# Patient Record
Sex: MALE | Race: WHITE | NOT HISPANIC OR LATINO | Employment: FULL TIME | URBAN - METROPOLITAN AREA
[De-identification: names, ages, dates, MRNs, and addresses within clinical notes are randomized per-mention and may not be internally consistent; named-entity substitution may affect disease eponyms.]

---

## 2017-11-24 ENCOUNTER — ALLSCRIPTS OFFICE VISIT (OUTPATIENT)
Dept: OTHER | Facility: OTHER | Age: 32
End: 2017-11-24

## 2017-11-26 NOTE — PROGRESS NOTES
Assessment    1  Mechanical low back pain (724 2) (M54 5)   2  Umbilical swelling (283 85) (R19 09)   3  Gastroesophageal reflux disease, esophagitis presence not specified (530 81) (K21 9)   4  Elevated BP without diagnosis of hypertension (796 2) (R03 0)   5  Cigarette nicotine dependence without complication (166 8) (P22 923)    Plan   Gastroesophageal reflux disease, esophagitis presence not specified    · Omeprazole 40 MG Oral Capsule Delayed Release; take 1 capsule by mouthevery morning BEFORE BREAKFAST  Mechanical low back pain    · Meloxicam 15 MG Oral Tablet; TAKE 1 TABLET DAILY AS NEEDED   · Metaxalone 800 MG Oral Tablet; TAKE ONE (1) TABLET(S) EVERY 6 TO 8HOURSAS NEEDED FOR PAIN  Umbilical swelling    · 1 - Skip DAS, Herman King (General Surgery) Co-Management  *  Status: Active Requested for: 24XCU2559  Care Summary provided  : Yes  Follow-up visit in 1 month Evaluation and Treatment  Follow-up  Status: Hold For - Scheduling  Requested for: 11XWU2728 Ordered; For: Elevated BP without diagnosis of hypertension;  Ordered By: Es Stockton  Performed:   Due: 80RJY1491   Discussion/Summary    NSAID and muscle relaxer refills issued to use as needed for LBP  Use back support as tolerated, activity as tolerated, and alternating ice/heat compresses  Discussed possibility of PT if pain persists  entered to see surgeon for further eval of possible umbilical hernia  issued for rx omeprazole to use instead of OTC in 1 month for recheck of BP  May need start of antihypertensive if still elevated  Further discuss smoking cessation at that time  shot declined today  Possible side effects of new medications were reviewed with the patient/guardian today  The treatment plan was reviewed with the patient/guardian  The patient/guardian understands and agrees with the treatment plan      Chief Complaint  back pain       History of Present Illness  HPI: Has had right sided back pain again for past month   Wears a copper fit back brace but hasn't been able to wear due to belly button pain past few weeks  Thinks he has a hernia  Sees a bulge  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)  ibuprofen 4-6 tablets daily without relief  been given muscle relaxers last year but makes him feel weird and can't take when working  OTC omeprazole recently w/o as much benefit as when he was on rx strength  Requests refill of  Review of Systems   Constitutional: no fever  Gastrointestinal: no nausea,-- no vomiting,-- no constipation-- and-- no diarrhea--   The patient presents with complaints of moderate mid abdominal pain, described as sharp, non-radiating  Musculoskeletal: as noted in HPI  Active Problems  1  Cigarette nicotine dependence without complication (059 5) (Y70 593)   2  Elevated BP without diagnosis of hypertension (796 2) (R03 0)   3  History of Encounter for removal of sutures (V58 32) (Z48 02)   4  Gastroesophageal reflux disease, esophagitis presence not specified (530 81) (K21 9)   5  History of Localized skin mass, lump, or swelling (782 2) (R22 9)   6  Mechanical low back pain (724 2) (M54 5)   7  Migraine headache (346 90) (G43 909)   8  Need for Tdap vaccination (V06 1) (Z23)   9  Screening for hyperlipidemia (V77 91) (Z13 220)   10  History of Viral wart on finger (078 10) (B07 9)    Past Medical History  1  Acute upper respiratory infection (465 9) (J06 9)   2  History of Encounter for removal of sutures (V58 32) (Z48 02)   3  History of Localized skin mass, lump, or swelling (782 2) (R22 9)   4  Mechanical low back pain (724 2) (M54 5)   5  History of Viral wart on finger (078 10) (B07 9)  Active Problems And Past Medical History Reviewed: The active problems and past medical history were reviewed and updated today  Family History  Mother    1  Family history of Breast cancer (174 9) (C50 919)   2  Family history of Degenerative disc disease (722 6)  Father    3   Family history of Diabetes (250 00) (E11 9)  Sister    4  Family history of Diabetes (250 00) (E11 9)   5  Family history of migraine headaches (V17 2) (Z82 0)    Social History     · History of Current every day smoker (305 1) (F17 200)   · Current smoker on some days (305 1) (F17 200)   · History of prescription drug abuse (305 93) (F19 21)   ·   The social history was reviewed and updated today  Current Meds   1  Buprenorphine HCl-Naloxone HCl - 8-2 MG Sublingual Tablet Sublingual Recorded   2  Ibuprofen 200 MG Oral Tablet Recorded   3  Omeprazole 40 MG Oral Capsule Delayed Release; take 1 capsule by mouth every morning BEFORE BREAKFAST; Therapy: 47KCD7947 to (Evaluate:06Jan2017)  Requested for: 99NNM1976; Last Rx:78Bgt7929 Ordered    The medication list was reviewed and updated today  Allergies  1  No Known Drug Allergies    Vitals   Recorded: 57KLW2064 09:52AM   Temperature 96 F, Tympanic   Heart Rate 88   Systolic 482, Sitting   Diastolic 84, Sitting   BP CUFF SIZE Large   Height 6 ft 1 5 in   Weight 292 lb    BMI Calculated 38   BSA Calculated 2 54       Physical Exam   Constitutional  General appearance: No acute distress, well appearing and well nourished  Eyes  Conjunctiva and lids: No swelling, erythema, or discharge  Pulmonary  Respiratory effort: No increased work of breathing or signs of respiratory distress  Abdomen  Abdomen: Abnormal   The abdomen was rounded  There was moderate tenderness in the periumbilical area  The abdomen was not firm-- and-- not rigid  Guarding was present  mass palpated in the periumbilical area  Psychiatric  Mood and affect: Normal          Results/Data  PHQ-2 Adult Depression Screening 24Nov2017 09:56AM User, Ahs     Test Name Result Flag Reference   PHQ-2 Adult Depression Score 2       Over the last two weeks, how often have you been bothered by any of the following problems?  Little interest or pleasure in doing things: Several days - 1 Feeling down, depressed, or hopeless: Several days - 1   PHQ-2 Adult Depression Screening Negative         Attending Note  Collaborating Physician Note: Collaborating Note: I agree with the Advanced Practitioner note  Future Appointments    Date/Time Provider Specialty Site   12/07/2017 09:30 AM Royal Jaramillo MD General Surgery [facility]   01/02/2018 11:20 AM Chavo Mejia MD       Signatures   Electronically signed by :  MUKESH Holland; Nov 24 2017  2:01PM EST                       (Author)    Electronically signed by : Francisca Donohue MD; Nov 25 2017  6:48AM EST                       (Co-author)

## 2017-11-27 ENCOUNTER — GENERIC CONVERSION - ENCOUNTER (OUTPATIENT)
Dept: OTHER | Facility: OTHER | Age: 32
End: 2017-11-27

## 2017-12-15 ENCOUNTER — OFFICE VISIT (OUTPATIENT)
Dept: URGENT CARE | Facility: CLINIC | Age: 32
End: 2017-12-15
Payer: COMMERCIAL

## 2017-12-15 PROCEDURE — 99213 OFFICE O/P EST LOW 20 MIN: CPT

## 2017-12-16 NOTE — PROGRESS NOTES
Assessment  1  Acute lumbar myofascial strain (847 2) (L56 942Y)    Plan  Acute lumbar myofascial strain    · Methocarbamol 750 MG Oral Tablet; TAKE 1 TABLET EVERY 6 HOURS ASNEEDED   · PredniSONE 10 MG Oral Tablet; 6,5,4,3,2,1    Discussion/Summary  Discussion Summary:   Follow-up with PCP next week  Medication Side Effects Reviewed: Possible side effects of new medications were reviewed with the patient/guardian today  Understands and agrees with treatment plan: The treatment plan was reviewed with the patient/guardian  The patient/guardian understands and agrees with the treatment plan      Chief Complaint  1  Back Pain  Chief Complaint Free Text Note Form: Pt reports 4 days of lower back pain  Denies injury  History of Present Illness  HPI: patient presents with complaint of exacerbation of chronic myofascial right-sided thoracic back pain  He does not recall a specific injury, he does recall leaning forward and standing up and feeling acute pain  he saw his PCP 3 weeks ago for similar complaint  he was unable to  Skelaxin due to cost  he also has not suspected hernia for which she has not yet seen the surgeon  He denies any radicular symptoms or lower extremity weakness   Hospital Based Practices Required Assessment:  Pain Assessment  the patient states they have pain  The pain is located in the back  (on a scale of 0 to 10, the patient rates the pain at 7 )  Abuse And Domestic Violence Screen   Domestic violence screen not done today  Reason DV Screen not done: family present   Depression And Suicide Screen  Suicide screen not done today  -- Reason suicide screen not done: family present  Prefered Language is  [place]  Primary Language is  English  Review of Systems  Focused-Male:  Constitutional: no fever or chills, feels well, no tiredness, no recent weight loss or weight gain  Musculoskeletal: as noted in HPI    Neurological: no complaints of headache, no confusion, no numbness or tingling, no dizziness or fainting  Active Problems  1  Cigarette nicotine dependence without complication (803 7) (F07 048)   2  Elevated BP without diagnosis of hypertension (796 2) (R03 0)   3  History of Encounter for removal of sutures (V58 32) (Z48 02)   4  Gastroesophageal reflux disease, esophagitis presence not specified (530 81) (K21 9)   5  History of Localized skin mass, lump, or swelling (782 2) (R22 9)   6  Mechanical low back pain (724 2) (M54 5)   7  Migraine headache (346 90) (G43 909)   8  Need for Tdap vaccination (V06 1) (Z23)   9  Screening for hyperlipidemia (V77 91) (Z13 220)   10  Umbilical swelling (952 05) (R19 09)   11  History of Viral wart on finger (078 10) (B07 9)    Past Medical History  1  Acute upper respiratory infection (465 9) (J06 9)   2  History of Encounter for removal of sutures (V58 32) (Z48 02)   3  History of Localized skin mass, lump, or swelling (782 2) (R22 9)   4  Mechanical low back pain (724 2) (M54 5)   5  History of Viral wart on finger (078 10) (B07 9)    Family History  Mother    1  Family history of Breast cancer (174 9) (C50 919)   2  Family history of Degenerative disc disease (722 6)  Father    3  Family history of Diabetes (250 00) (E11 9)  Sister    4  Family history of Diabetes (250 00) (E11 9)   5  Family history of migraine headaches (V17 2) (Z82 0)    Social History   · History of Current every day smoker (305 1) (F17 200)   · Current smoker on some days (305 1) (F17 200)   · History of prescription drug abuse (305 93) (F19 21)   ·     Current Meds   1  Buprenorphine HCl-Naloxone HCl - 8-2 MG Sublingual Tablet Sublingual; Therapy: (Recorded:85Ceh9088) to Recorded   2  Ibuprofen 200 MG Oral Tablet Recorded   3  Meloxicam 15 MG Oral Tablet; TAKE 1 TABLET DAILY AS NEEDED; Therapy: 82LBZ9161 to (Evaluate:81Msy1387)  Requested for: 45PRT2797; Last Rx:24Nov2017 Ordered   4   Metaxalone 800 MG Oral Tablet; TAKE ONE (1) TABLET(S) EVERY 6 TO 8HOURS AS NEEDED FOR PAIN; Therapy: 32SGL4371 to (Evaluate:02Dec2017)  Requested for: 08TMA6694; Last Rx:24Nov2017 Ordered   5  Omeprazole 40 MG Oral Capsule Delayed Release; take 1 capsule by mouth every morning BEFORE BREAKFAST; Therapy: 13ISP5076 to (Evaluate:50Iwj4984)  Requested for: 04ZYL8507; Last Rx:24Nov2017 Ordered    Allergies  1  No Known Drug Allergies    Vitals  Signs   Recorded: 15Dec2017 05:49PM   Temperature: 97 6 F  Heart Rate: 97  Respiration: 16  Systolic: 282  Diastolic: 84  Weight: 130 lb   BMI Calculated: 38 13  BSA Calculated: 2 54  O2 Saturation: 98    Physical Exam   Constitutional  General appearance: No acute distress, well appearing and well nourished  Musculoskeletal  Gait and station: Normal    Inspection/palpation of joints, bones, and muscles: Abnormal  -- quite tender to palpation right paravertebral musculature distal thoracic and upper lumbar spine, normal squat, lower extremity strength normal   Neurologic  Reflexes: 2+ and symmetric  Sensation: No sensory loss  Psychiatric  Orientation to person, place and time: Normal        Message  Return to work or school:   [facility] is under my professional care  He was seen in my office on 12/15/2017   He is able to return to work on  12/17/2017      ABDOULAYE Gupta        Future Appointments    Date/Time Provider Specialty Site   01/02/2018 11:20 AM Shefali Conti, [address] [facility] Jose Car MD     Signatures   Electronically signed by : Pramod Jj DO; Dec 15 2017  6:11PM EST                       (Author)

## 2018-01-02 ENCOUNTER — ALLSCRIPTS OFFICE VISIT (OUTPATIENT)
Dept: OTHER | Facility: OTHER | Age: 33
End: 2018-01-02

## 2018-01-10 NOTE — MISCELLANEOUS
Message   Recorded as Task   Date: 12/07/2016 12:34 PM, Created By: Caden Buckner   Task Name: Call Back   Assigned To: Angelina Henson   Regarding Patient: Celena Uribe, Status: Active   CommentCathi Gilman - 07 Dec 2016 12:34 PM     TASK CREATED  Refilled omeprazole for 1 month  Needs to f/u as instructed to get further refills  MattIvonne man - 07 Dec 2016 6:35 PM     TASK REPLIED TO: Previously Assigned To [address] [facility]  I notified patient that he needs a f/u for further refills  Did not wish to follow-up at this time  He said he will call back to schedule  Active Problems    1  Cigarette nicotine dependence without complication (912 5) (B46 048)   2  Elevated blood pressure reading without diagnosis of hypertension (796 2) (R03 0)   3  Encounter for removal of sutures (V58 32) (Z48 02)   4  Gastroesophageal reflux disease, esophagitis presence not specified (530 81) (K21 9)   5  Localized skin mass, lump, or swelling (782 2) (R22 9)   6  Mechanical low back pain (724 2) (M54 5)   7  Migraine headache (346 90) (G43 909)   8  Need for Tdap vaccination (V06 1) (Z23)   9  Screening for hyperlipidemia (V77 91) (Z13 220)   10  History of Viral wart on finger (078 10) (B07 9)    Current Meds   1  Buprenorphine HCl-Naloxone HCl - 8-2 MG Sublingual Tablet Sublingual Recorded   2  Ibuprofen 200 MG Oral Tablet Recorded   3  Meloxicam 15 MG Oral Tablet (Mobic); TAKE 1 TABLET DAILY WITH FOOD; Therapy: 43GZR9316 to (Evaluate:45Myd0178)  Requested for: 66TWS3242; Last   Rx:38Hbs0227 Ordered   4  Omeprazole 40 MG Oral Capsule Delayed Release; take 1 capsule by mouth every   morning BEFORE BREAKFAST; Therapy: 70OPH1150 to (Evaluate:94Wix9051)  Requested for: 54FIZ8818; Last   Rx:94Mif4692 Ordered    Allergies    1   No Known Drug Allergies    Plan  Gastroesophageal reflux disease, esophagitis presence not specified    · Omeprazole 40 MG Oral Capsule Delayed Release; take 1 capsule by mouth  every morning BEFORE BREAKFAST    Signatures   Electronically signed by : Loren Patel; Dec  7 2016  9:55PM EST                       (Author)

## 2018-01-12 NOTE — MISCELLANEOUS
Message  Return to work or school:   [facility] is under my professional care  He was seen in my office on   He is able to return to work on  11/28/17      Excuse 11/21-11/28/17  MUKESH Barroso  Signatures   Electronically signed by : MUKESH Barroso; Nov 24 2017 10:12AM EST                       (Author)    Electronically signed by :  MUKESH Barroso; Nov 27 2017  6:48PM EST                       (Author)

## 2018-01-13 VITALS
TEMPERATURE: 96 F | SYSTOLIC BLOOD PRESSURE: 152 MMHG | HEART RATE: 88 BPM | DIASTOLIC BLOOD PRESSURE: 84 MMHG | HEIGHT: 74 IN | WEIGHT: 292 LBS | BODY MASS INDEX: 37.47 KG/M2

## 2018-01-17 NOTE — MISCELLANEOUS
Message   Recorded as Task   Date: 11/27/2017 01:49 PM, Created By: Jess Kaye   Task Name: Care Coordination   Assigned To: [address] [place] [address]   Regarding Patient: Irasema hTomas, Status: Active   Comment:    Jess Kaye - 27 Nov 2017 1:49 PM     TASK CREATED  Caller: Self; Care Coordination; [phone] (Home); [phone] (Work)  was seen last week - given work excuse note to return as of today - wasn't feeling well yet this morning - call out of work today - asking for extension of note - will return to work tomorrow - please fax to [phone] and pcb when done   Ivonne Ellington - 27 Nov 2017 3:01 PM     TASK REASSIGNED: Previously Assigned To [address] [place] [address]  Please adviseGeraldine - 27 Nov 2017 6:56 PM     TASK REPLIED TO: Previously Assigned To Be Mendiola  Reprinted and wife picked up  Active Problems    1  Cigarette nicotine dependence without complication (181 0) (L24 000)   2  Elevated BP without diagnosis of hypertension (796 2) (R03 0)   3  History of Encounter for removal of sutures (V58 32) (Z48 02)   4  Gastroesophageal reflux disease, esophagitis presence not specified (530 81) (K21 9)   5  History of Localized skin mass, lump, or swelling (782 2) (R22 9)   6  Mechanical low back pain (724 2) (M54 5)   7  Migraine headache (346 90) (G43 909)   8  Need for Tdap vaccination (V06 1) (Z23)   9  Screening for hyperlipidemia (V77 91) (Z13 220)   10  Umbilical swelling (210 12) (R19 09)   11  History of Viral wart on finger (078 10) (B07 9)    Current Meds   1  Buprenorphine HCl-Naloxone HCl - 8-2 MG Sublingual Tablet Sublingual Recorded   2  Ibuprofen 200 MG Oral Tablet Recorded   3  Meloxicam 15 MG Oral Tablet; TAKE 1 TABLET DAILY AS NEEDED; Therapy: 53HYR8366 to (Evaluate:72Ioa7082)  Requested for: 57JCW4017; Last   Rx:24Nov2017 Ordered   4   Metaxalone 800 MG Oral Tablet; TAKE ONE (1) TABLET(S) EVERY 6 TO 8HOURS AS   NEEDED FOR PAIN; Therapy: 14DNJ2050 to (Shivani Winkler)  Requested for: 19ANN2144; Last   Rx:24Nov2017 Ordered   5  Omeprazole 40 MG Oral Capsule Delayed Release; take 1 capsule by mouth every   morning BEFORE BREAKFAST; Therapy: 08XQW5260 to (Evaluate:34Iea1546)  Requested for: 87BSO5973; Last   Rx:24Nov2017 Ordered    Allergies    1  No Known Drug Allergies    Signatures   Electronically signed by :  MUKESH Nixon; Nov 27 2017  8:08PM EST                       (Author)

## 2018-01-23 VITALS
TEMPERATURE: 97.6 F | HEART RATE: 97 BPM | OXYGEN SATURATION: 98 % | DIASTOLIC BLOOD PRESSURE: 84 MMHG | WEIGHT: 293 LBS | BODY MASS INDEX: 38.13 KG/M2 | RESPIRATION RATE: 16 BRPM | SYSTOLIC BLOOD PRESSURE: 154 MMHG

## 2018-01-23 NOTE — MISCELLANEOUS
Provider Comments  Provider Comments:   Patient was a no show for today's OV  Signatures   Electronically signed by :  MUKESH Connor; Jan 2 2018 11:40AM EST                       (Author)

## 2018-01-24 NOTE — MISCELLANEOUS
Message  Return to work or school:   [facility] is under my professional care  He was seen in my office on 12/15/2017   He is able to return to work on  12/17/2017       ABDOULAYE Hopper        Future Appointments    Signatures   Electronically signed by : Khris Dawson DO; Dec 15 2017  6:11PM EST                       (Author)

## 2018-02-14 ENCOUNTER — OFFICE VISIT (OUTPATIENT)
Dept: FAMILY MEDICINE CLINIC | Facility: HOSPITAL | Age: 33
End: 2018-02-14
Payer: COMMERCIAL

## 2018-02-14 VITALS
TEMPERATURE: 98 F | SYSTOLIC BLOOD PRESSURE: 128 MMHG | HEIGHT: 75 IN | DIASTOLIC BLOOD PRESSURE: 76 MMHG | WEIGHT: 291 LBS | BODY MASS INDEX: 36.18 KG/M2 | HEART RATE: 88 BPM

## 2018-02-14 DIAGNOSIS — R51.9 ACUTE INTRACTABLE HEADACHE, UNSPECIFIED HEADACHE TYPE: Primary | ICD-10-CM

## 2018-02-14 DIAGNOSIS — R53.83 FATIGUE, UNSPECIFIED TYPE: ICD-10-CM

## 2018-02-14 LAB — S PYO AG THROAT QL: NEGATIVE

## 2018-02-14 PROCEDURE — 87880 STREP A ASSAY W/OPTIC: CPT | Performed by: NURSE PRACTITIONER

## 2018-02-14 PROCEDURE — 99213 OFFICE O/P EST LOW 20 MIN: CPT | Performed by: NURSE PRACTITIONER

## 2018-02-14 RX ORDER — OMEPRAZOLE 40 MG/1
1 CAPSULE, DELAYED RELEASE ORAL
COMMUNITY
Start: 2016-05-11

## 2018-02-14 RX ORDER — BUPRENORPHINE HYDROCHLORIDE AND NALOXONE HYDROCHLORIDE DIHYDRATE 8; 2 MG/1; MG/1
TABLET SUBLINGUAL
COMMUNITY
End: 2018-05-08 | Stop reason: DRUGHIGH

## 2018-02-14 RX ORDER — IBUPROFEN 200 MG
TABLET ORAL
COMMUNITY
End: 2018-05-08 | Stop reason: ALTCHOICE

## 2018-02-14 NOTE — LETTER
February 14, 2018     Patient: Adelita Flores   YOB: 1985   Date of Visit: 2/14/2018       To Whom it May Concern:    Adelita Flores is under my professional care  He was seen in my office on 2/14/2018  He may return to work on 2/16/18  If you have any questions or concerns, please don't hesitate to call           Sincerely,          MUKESH Inman        CC: No Recipients

## 2018-02-14 NOTE — PROGRESS NOTES
Assessment/Plan:  Headache and fatigue likely viral    Negative rapid strep  Unlikely flu given lack of other symptoms  Call with fever > 101, new symptoms or no improvement or worsening  Diagnoses and all orders for this visit:    Acute intractable headache, unspecified headache type  -     POCT rapid strepA    Fatigue, unspecified type  -     POCT rapid strepA    Other orders  -     buprenorphine-naloxone (SUBOXONE) 8-2 mg per SL tablet; Place under the tongue  -     omeprazole (PriLOSEC) 40 MG capsule; Take 1 capsule by mouth  -     ibuprofen (MOTRIN) 200 mg tablet; Take by mouth          Subjective:      Patient ID: Calvin Mcdonald is a 28 y o  male  Yesterday started with headache  Took ibuprofen  Headache dulled but cam back and worsened  Woke this morning felt like he was hit by a truck  Very fatigued  Back hurts a little  Denies fever  Denies abdominal pain/N/V/D  Denies sore throat, congestion or cough  Headache is somewhat better  Photophobia  Has some neck pain  No flu shot  Daughter diagnosed with strep a few days ago  The following portions of the patient's history were reviewed and updated as appropriate: allergies, current medications, past medical history, past social history and problem list     Review of Systems   Constitutional: Positive for fatigue  Negative for chills and fever  HENT: Negative for congestion, ear pain, sinus pain, sinus pressure and sore throat  Respiratory: Negative for cough  Gastrointestinal: Negative for abdominal pain, diarrhea, nausea and vomiting  Musculoskeletal: Positive for back pain and neck pain  Negative for arthralgias  Skin: Negative for rash  Neurological: Positive for headaches  Objective:    Vitals:    02/14/18 1417   BP: 128/76   Pulse: 88   Temp: 98 °F (36 7 °C)        Physical Exam   Constitutional: He is oriented to person, place, and time     HENT:   Right Ear: Tympanic membrane, external ear and ear canal normal  Left Ear: Tympanic membrane, external ear and ear canal normal    Mouth/Throat: Uvula is midline, oropharynx is clear and moist and mucous membranes are normal    Eyes: Conjunctivae are normal  Pupils are equal, round, and reactive to light  Cardiovascular: Normal rate, regular rhythm and normal heart sounds  Pulmonary/Chest: Effort normal and breath sounds normal    Lymphadenopathy:     He has no cervical adenopathy  Neurological: He is alert and oriented to person, place, and time  Skin: Skin is warm and dry  Psychiatric: He has a normal mood and affect

## 2018-02-16 ENCOUNTER — TELEPHONE (OUTPATIENT)
Dept: FAMILY MEDICINE CLINIC | Facility: HOSPITAL | Age: 33
End: 2018-02-16

## 2018-02-16 NOTE — TELEPHONE ENCOUNTER
Patient asking for updated work note to include 2/16/18 - return to work 2/17/18    pls fax to [phone] attn:  Kia Day    [facility] to let know status of this request

## 2018-05-08 ENCOUNTER — OFFICE VISIT (OUTPATIENT)
Dept: FAMILY MEDICINE CLINIC | Facility: HOSPITAL | Age: 33
End: 2018-05-08
Payer: COMMERCIAL

## 2018-05-08 VITALS
HEIGHT: 75 IN | BODY MASS INDEX: 37.05 KG/M2 | SYSTOLIC BLOOD PRESSURE: 132 MMHG | TEMPERATURE: 98 F | DIASTOLIC BLOOD PRESSURE: 80 MMHG | HEART RATE: 84 BPM | WEIGHT: 298 LBS

## 2018-05-08 DIAGNOSIS — K52.9 GASTROENTERITIS: Primary | ICD-10-CM

## 2018-05-08 PROCEDURE — 99213 OFFICE O/P EST LOW 20 MIN: CPT | Performed by: NURSE PRACTITIONER

## 2018-05-08 RX ORDER — ATOMOXETINE 100 MG/1
CAPSULE ORAL DAILY
COMMUNITY
Start: 2018-04-24 | End: 2018-12-08

## 2018-05-08 RX ORDER — BUPRENORPHINE HYDROCHLORIDE 8 MG/1
TABLET SUBLINGUAL 3 TIMES DAILY
COMMUNITY
Start: 2018-04-19

## 2018-05-08 NOTE — PROGRESS NOTES
Assessment/Plan:     Likely a viral gastroenteritis  [place] diet consisting of small frequent meals  Avoid caffeine and dairy  Very slowly advance as tolerated  Work note given  Call with any worsening or no improvement in 4 days  Call with any blood in stool  Diagnoses and all orders for this visit:    Gastroenteritis    Other orders  -     atomoxetine (STRATTERA) 100 MG capsule;   -     buprenorphine (SUBUTEX) 8 mg;           Subjective:     Patient ID: Flavia Clements is a 28 y o  male  Last 2 days with diarrhea  Has abdominal pain  Diffuse abdominal pain  Stomach feels full  Not really eating  Drinking water and gatorade  Took pepto Bismol tablet  No blood in stool  No fever,chills  Had headache but went away  Had 4 episodes today  Not as much as yesterday  Had pulled pork and meatballs before symptoms started  No one else at party sick  No sick contacts at home  Review of Systems   Constitutional: Negative for chills and fever  Gastrointestinal: Positive for abdominal pain, diarrhea and nausea  Negative for blood in stool and vomiting  Neurological: Positive for headaches  The following portions of the patient's history were reviewed and updated as appropriate: allergies, current medications, past family history, past medical history, past social history, past surgical history and problem list     Objective:  Vitals:    05/08/18 1350   BP: 132/80   Pulse: 84   Temp: 98 °F (36 7 °C)      Physical Exam   Constitutional: He is oriented to person, place, and time  He appears well-developed and well-nourished  Cardiovascular: Normal rate, regular rhythm and normal heart sounds  Pulmonary/Chest: Effort normal and breath sounds normal    Abdominal: Bowel sounds are decreased  There is no hepatosplenomegaly  There is no tenderness  Neurological: He is alert and oriented to person, place, and time  Skin: Skin is warm and dry  Psychiatric: He has a normal mood and affect

## 2018-05-08 NOTE — LETTER
May 8, 2018     Patient: José Pack   YOB: 1985   Date of Visit: 5/8/2018       To Whom it May Concern:    José Pack is under my professional care  He was seen in my office on 5/8/2018  He may return to work on 5/10/18  Please excuse for 5/7-5/9/18 for gastroenteritis  If you have any questions or concerns, please don't hesitate to call           Sincerely,          MUKESH Nielsen        CC: No Recipients

## 2018-06-08 ENCOUNTER — OFFICE VISIT (OUTPATIENT)
Dept: FAMILY MEDICINE CLINIC | Facility: HOSPITAL | Age: 33
End: 2018-06-08
Payer: COMMERCIAL

## 2018-06-08 VITALS
HEIGHT: 75 IN | DIASTOLIC BLOOD PRESSURE: 84 MMHG | WEIGHT: 299.8 LBS | SYSTOLIC BLOOD PRESSURE: 144 MMHG | BODY MASS INDEX: 37.28 KG/M2 | HEART RATE: 84 BPM | TEMPERATURE: 97 F

## 2018-06-08 DIAGNOSIS — R11.2 NON-INTRACTABLE VOMITING WITH NAUSEA, UNSPECIFIED VOMITING TYPE: Primary | ICD-10-CM

## 2018-06-08 DIAGNOSIS — R19.7 DIARRHEA, UNSPECIFIED TYPE: ICD-10-CM

## 2018-06-08 PROCEDURE — 99214 OFFICE O/P EST MOD 30 MIN: CPT | Performed by: NURSE PRACTITIONER

## 2018-06-08 RX ORDER — ONDANSETRON 8 MG/1
8 TABLET, ORALLY DISINTEGRATING ORAL EVERY 8 HOURS PRN
Qty: 20 TABLET | Refills: 0 | Status: SHIPPED | OUTPATIENT
Start: 2018-06-08 | End: 2018-08-03 | Stop reason: ALTCHOICE

## 2018-06-08 NOTE — LETTER
June 8, 2018     Patient: Yosi French   YOB: 1985   Date of Visit: 6/8/2018       To Whom it May Concern:    Yosi French is under my professional care  He was seen in my office on 6/8/2018  He may return to work on Monday 6/11/18  If you have any questions or concerns, please don't hesitate to call           Sincerely,          MUKESH Le        CC: No Recipients

## 2018-06-08 NOTE — PROGRESS NOTES
Assessment/Plan:    No problem-specific Assessment & Plan notes found for this encounter  Diagnoses and all orders for this visit:    Non-intractable vomiting with nausea, unspecified vomiting type  Comments:  given recurrence will evaluate further w/blood work and stool cultures; may need abdomen US and GI consult, use zofran as needed, keep diet bland, push fluids  Orders:  -     ondansetron (ZOFRAN-ODT) 8 mg disintegrating tablet; Take 1 tablet (8 mg total) by mouth every 8 (eight) hours as needed for nausea or vomiting  -     CBC and differential; Future  -     Comprehensive metabolic panel; Future  -     Celiac Disease Comprehensive Panel; Future  -     Amylase; Future  -     Lipase; Future  -     Sedimentation rate, automated; Future  -     TSH, 3rd generation with T4 reflex; Future  -     CBC and differential  -     Comprehensive metabolic panel  -     Amylase  -     Lipase  -     Sedimentation rate, automated  -     TSH, 3rd generation with T4 reflex    Diarrhea, unspecified type  -     CBC and differential; Future  -     Comprehensive metabolic panel; Future  -     Celiac Disease Comprehensive Panel; Future  -     Sedimentation rate, automated; Future  -     TSH, 3rd generation with T4 reflex; Future  -     White Blood Cells, Stool by Gram Stain; Future  -     Clostridium difficile toxin by PCR; Future  -     Giardia antigen; Future  -     AFB Smear, Stool; Future  -     CBC and differential  -     Comprehensive metabolic panel  -     Sedimentation rate, automated  -     TSH, 3rd generation with T4 reflex  -     White Blood Cells, Stool by Gram Stain  -     Clostridium difficile toxin by PCR  -     Giardia antigen  -     AFB Smear, Stool          Subjective:      Patient ID: Flavia Clements is a 28 y o  male here for acute visit  Hasn't felt well this week w/weakness, diarrhea, feeling hot/cold, and stomach pains  No fever noted  Eating bland crackers and toast  Drinking water and powerade     Had similar last month and had gotten better until this week  No recent intake of recalled foods or raw foods  No contacts with similar symptoms  Smoking less recently  Taking routine meds as rx'd  No other meds taken  No ETOH use  Denies drug use  The following portions of the patient's history were reviewed and updated as appropriate: allergies, current medications, past medical history, past social history and problem list     Review of Systems   Constitutional: Positive for appetite change and chills  Negative for fever and unexpected weight change  Respiratory: Negative for cough and shortness of breath  Gastrointestinal: Positive for abdominal pain, diarrhea and nausea  Negative for blood in stool and vomiting  Psychiatric/Behavioral: Negative for dysphoric mood  Objective:      /84 (Patient Position: Sitting, Cuff Size: Large)   Pulse 84   Temp (!) 97 °F (36 1 °C) (Tympanic)   Ht 6' 3" (1 905 m)   Wt 136 kg (299 lb 12 8 oz)   BMI 37 47 kg/m²        Physical Exam   Constitutional: He is oriented to person, place, and time  He appears well-developed and well-nourished  No distress  HENT:   Head: Normocephalic and atraumatic  Eyes: Conjunctivae are normal  No scleral icterus  Neck: No thyromegaly present  Cardiovascular: Normal rate and regular rhythm  Pulmonary/Chest: Effort normal and breath sounds normal  No respiratory distress  Abdominal: Soft  He exhibits no mass  Bowel sounds are decreased  There is no tenderness  There is no rebound and no guarding  Abdomen rounded, obese   Lymphadenopathy:     He has no cervical adenopathy  Neurological: He is alert and oriented to person, place, and time  Skin:   Diaphoretic    Psychiatric: He has a normal mood and affect  Vitals reviewed

## 2018-08-03 ENCOUNTER — HOSPITAL ENCOUNTER (EMERGENCY)
Facility: HOSPITAL | Age: 33
Discharge: HOME | End: 2018-08-03
Admitting: EMERGENCY MEDICINE
Payer: COMMERCIAL

## 2018-08-03 ENCOUNTER — APPOINTMENT (EMERGENCY)
Dept: RADIOLOGY | Facility: HOSPITAL | Age: 33
End: 2018-08-03
Payer: COMMERCIAL

## 2018-08-03 VITALS
BODY MASS INDEX: 36.06 KG/M2 | HEIGHT: 75 IN | DIASTOLIC BLOOD PRESSURE: 93 MMHG | OXYGEN SATURATION: 98 % | SYSTOLIC BLOOD PRESSURE: 154 MMHG | WEIGHT: 290 LBS | HEART RATE: 84 BPM | TEMPERATURE: 97.3 F | RESPIRATION RATE: 18 BRPM

## 2018-08-03 DIAGNOSIS — M10.9 ACUTE GOUT: Primary | ICD-10-CM

## 2018-08-03 PROCEDURE — 99283 EMERGENCY DEPT VISIT LOW MDM: CPT

## 2018-08-03 PROCEDURE — 73630 X-RAY EXAM OF FOOT: CPT

## 2018-08-03 RX ORDER — INDOMETHACIN 25 MG/1
50 CAPSULE ORAL 3 TIMES DAILY PRN
Qty: 18 CAPSULE | Refills: 0 | Status: SHIPPED | OUTPATIENT
Start: 2018-08-03 | End: 2018-12-08

## 2018-08-04 NOTE — DISCHARGE INSTRUCTIONS
Gout   WHAT YOU NEED TO KNOW:   Gout is a form of arthritis that causes severe joint pain, redness, swelling, and stiffness  Acute gout pain starts suddenly, gets worse quickly, and stops on its own  Acute gout can become chronic and cause permanent damage to the joints  DISCHARGE INSTRUCTIONS:   Return to the emergency department if:   · You have severe pain in one or more of your joints that you cannot tolerate  · You have a fever or redness that spreads beyond the joint area  Contact your healthcare provider if:   · You have new symptoms, such as a rash, after you start gout treatment  · Your joint pain and swelling do not go away, even after treatment  · You are not urinating as much or as often as you usually do  · You have trouble taking your gout medicines  · You have questions or concerns about your condition or care  Medicines: You may need any of the following:  · Prescription pain medicine  may be given  Ask your healthcare provider how to take this medicine safely  Some prescription pain medicines contain acetaminophen  Do not take other medicines that contain acetaminophen without talking to your healthcare provider  Too much acetaminophen may cause liver damage  Prescription pain medicine may cause constipation  Ask your healthcare provider how to prevent or treat constipation  · NSAIDs , such as ibuprofen, help decrease swelling, pain, and fever  This medicine is available with or without a doctor's order  NSAIDs can cause stomach bleeding or kidney problems in certain people  If you take blood thinner medicine, always ask your healthcare provider if NSAIDs are safe for you  Always read the medicine label and follow directions  · Gout medicine  decreases joint pain and swelling  It may also be given to prevent new gout attacks  · Steroids  reduce inflammation and can help your joint stiffness and pain during gout attacks      · Uric acid medicine  may be given to reduce the amount of uric acid your body makes  Some medicines may help you pass more uric acid when you urinate  · Take your medicine as directed  Contact your healthcare provider if you think your medicine is not helping or if you have side effects  Tell him or her if you are allergic to any medicine  Keep a list of the medicines, vitamins, and herbs you take  Include the amounts, and when and why you take them  Bring the list or the pill bottles to follow-up visits  Carry your medicine list with you in case of an emergency  Follow up with your healthcare provider as directed:  Write down your questions so you remember to ask them during your visits  Manage gout:   · Rest your painful joint so it can heal   Your healthcare provider may recommend crutches or a walker if the affected joint is in a leg  · Apply ice to your joint  Ice decreases pain and swelling  Use an ice pack, or put crushed ice in a plastic bag  Cover the ice pack or bag with a towel before you apply it to your painful joint  Apply ice for 15 to 20 minutes every hour, or as directed  · Elevate your joint  Elevation helps reduce swelling and pain  Raise your joint above the level of your heart as often as you can  Prop your painful joint on pillows to keep it above your heart comfortably  · Go to physical therapy if directed  A physical therapist can teach you exercises to improve flexibility and range of motion  Prevent gout attacks:   · Do not eat high-purine foods  These foods include meats, seafood, asparagus, spinach, cauliflower, and some types of beans  Healthcare providers may tell you to eat more low-fat milk products, such as yogurt  Milk products may decrease your risk for gout attacks  Vitamin C and coffee may also help  Your healthcare provider or dietitian can help you create a meal plan  · Drink more liquids as directed  Liquids such as water help remove uric acid from your body   Ask how much liquid to drink each day and which liquids are best for you  · Manage your weight  Weight loss may decrease the amount of uric acid in your body  Exercise can help you lose weight  Talk to your healthcare provider about the best exercises for you  · Control your blood sugar level if you have diabetes  Keep your blood sugar level in a normal range  This can help prevent gout attacks  · Limit or do not drink alcohol as directed  Alcohol can trigger a gout attack  Alcohol also increases your risk for dehydration  Ask your healthcare provider if alcohol is safe for you  © 2017 [facility] Information is for End User's use only and may not be sold, redistributed or otherwise used for commercial purposes  All illustrations and images included in CareNotes® are the copyrighted property of A D [employer] M [employer]  or [employer]  The above information is an  only  It is not intended as medical advice for individual conditions or treatments  Talk to your doctor, nurse or pharmacist before following any medical regimen to see if it is safe and effective for you

## 2018-08-04 NOTE — ED PROVIDER NOTES
History  Chief Complaint   Patient presents with    Foot Swelling     a week + of swelling and pain in toe  pt reports having gout but while walking heard a crack  pt reports swelling is better now  no fevers  Patient presents to the ED with left foot pain that started 1 week ago  He states the pain started at left great toe and moved to dorsal left foot  He states he had a popping and cracking in his foot which he normally has  He denies injuring foot  He does not have erythema or warmth  He denies fevers/chills  Patient has been taking motrin for pain  He does have a history of gout  He states walking does not make the pain worse  History provided by:  Patient  Foot Injury - Major   Location:  Foot  Time since incident:  1 week  Injury: no    Foot location:  L foot  Pain details:     Quality:  Aching    Radiates to:  Does not radiate    Severity:  Moderate    Onset quality:  Gradual    Duration:  1 week    Timing:  Constant    Progression:  Unchanged  Chronicity:  New  Dislocation: no    Foreign body present:  No foreign bodies  Tetanus status:  Unknown  Prior injury to area:  No  Relieved by:  Nothing  Exacerbated by: palpation or movement  Ineffective treatments:  NSAIDs  Associated symptoms: swelling    Associated symptoms: no decreased ROM, no fever, no numbness and no tingling        Prior to Admission Medications   Prescriptions Last Dose Informant Patient Reported?  Taking?   atomoxetine (STRATTERA) 100 MG capsule Past Month at Unknown time  Yes Yes   Sig: daily     buprenorphine (SUBUTEX) 8 mg   Yes No   Sig: 3 (three) times a day     omeprazole (PriLOSEC) 40 MG capsule   Yes No   Sig: Take 1 capsule by mouth      Facility-Administered Medications: None       Past Medical History:   Diagnosis Date    Acid reflux disease     Gout        Past Surgical History:   Procedure Laterality Date    WISDOM TOOTH EXTRACTION         Family History   Problem Relation Age of Onset    Breast cancer Mother     Other Mother         degenerative disc disease     Diabetes Father     Diabetes Sister     Migraines Sister      I have reviewed and agree with the history as documented  Social History   Substance Use Topics    Smoking status: Current Every Day Smoker     Packs/day: 0 50    Smokeless tobacco: Never Used      Comment: thi some days smoker per allscripts     Alcohol use No        Review of Systems   Constitutional: Negative for chills and fever  Musculoskeletal:        Left foot pain   Skin: Negative for color change and wound  Neurological: Negative for dizziness, weakness and numbness  Psychiatric/Behavioral: Negative for confusion  All other systems reviewed and are negative  Physical Exam  Physical Exam   Constitutional: He is oriented to person, place, and time  He appears well-developed and well-nourished  HENT:   Head: Normocephalic and atraumatic  Eyes: Conjunctivae are normal    Neck: Normal range of motion  Cardiovascular: Normal rate, regular rhythm and normal heart sounds  Pulses:       Dorsalis pedis pulses are 2+ on the left side  Pulmonary/Chest: Effort normal and breath sounds normal    Musculoskeletal:        Left foot: There is tenderness and swelling  There is normal range of motion, no deformity and no laceration  Feet:    Neurological: He is alert and oriented to person, place, and time  He has normal strength  No sensory deficit  Skin: Skin is warm and dry  No bruising, no ecchymosis and no rash noted  He is not diaphoretic  No erythema  No pallor  Psychiatric: He has a normal mood and affect  Nursing note and vitals reviewed        Vital Signs  ED Triage Vitals [08/03/18 2108]   Temperature Pulse Respirations Blood Pressure SpO2   (!) 97 3 °F (36 3 °C) 84 18 154/93 98 %      Temp Source Heart Rate Source Patient Position - Orthostatic VS BP Location FiO2 (%)   Tympanic -- Sitting Right arm --      Pain Score       5 Vitals:    08/03/18 2108   BP: 154/93   Pulse: 84   Patient Position - Orthostatic VS: Sitting       Visual Acuity      ED Medications  Medications - No data to display    Diagnostic Studies  Results Reviewed     None                 XR foot 3+ views LEFT   ED Interpretation by Lizzette Cruz PA-C (08/03 2151)   No acute abnormalities  Procedures  Procedures       Phone Contacts  ED Phone Contact    ED Course                               MDM  Number of Diagnoses or Management Options  Acute gout: new and requires workup  Diagnosis management comments: Patient with pain in foot, heard a crack, will order xray to r/o fracture  Patient does have history of gout, will treat with indocin  Amount and/or Complexity of Data Reviewed  Tests in the radiology section of CPT®: ordered and reviewed  Independent visualization of images, tracings, or specimens: yes    Patient Progress  Patient progress: stable    CritCare Time    Disposition  Final diagnoses:   Acute gout - left great toe     Time reflects when diagnosis was documented in both MDM as applicable and the Disposition within this note     Time User Action Codes Description Comment    8/3/2018  9:53 PM Julián Olivas Add [M10 9] Acute gout     8/3/2018  9:53 PM Julián Olivas Modify [M10 9] Acute gout left great toe      ED Disposition     ED Disposition Condition Comment    Discharge  Melissa Collins discharge to home/self care      Condition at discharge: Stable        Follow-up Information     Follow up With Specialties Details Why Contact Info    Tian Carey MD Family Medicine In 1 week As needed, For recheck [facility]  [address] [place] [id number]            Patient's Medications   Discharge Prescriptions    INDOMETHACIN (INDOCIN) 25 MG CAPSULE    Take 2 capsules (50 mg total) by mouth 3 (three) times a day as needed for mild pain       Start Date: 8/3/2018  End Date: --       Order Dose: 48 mg       Quantity: 18 capsule    Refills: 0     No discharge procedures on file      ED Provider  Electronically Signed by           Jose Tabor PA-C  08/03/18 9178

## 2018-08-10 ENCOUNTER — OFFICE VISIT (OUTPATIENT)
Dept: FAMILY MEDICINE CLINIC | Facility: HOSPITAL | Age: 33
End: 2018-08-10
Payer: COMMERCIAL

## 2018-08-10 VITALS
SYSTOLIC BLOOD PRESSURE: 148 MMHG | TEMPERATURE: 98.5 F | HEIGHT: 74 IN | WEIGHT: 299 LBS | BODY MASS INDEX: 38.37 KG/M2 | HEART RATE: 108 BPM | DIASTOLIC BLOOD PRESSURE: 84 MMHG

## 2018-08-10 DIAGNOSIS — M79.675 GREAT TOE PAIN, LEFT: Primary | ICD-10-CM

## 2018-08-10 PROCEDURE — 99214 OFFICE O/P EST MOD 30 MIN: CPT | Performed by: NURSE PRACTITIONER

## 2018-08-10 RX ORDER — COLCHICINE 0.6 MG/1
0.6 TABLET ORAL DAILY
Qty: 30 TABLET | Refills: 0 | Status: SHIPPED | OUTPATIENT
Start: 2018-08-10 | End: 2018-12-08

## 2018-08-10 RX ORDER — PREDNISONE 10 MG/1
TABLET ORAL
COMMUNITY
Start: 2018-08-06 | End: 2018-12-08

## 2018-08-10 NOTE — LETTER
August 10, 2018     Patient: Apple Nieves   YOB: 1985   Date of Visit: 8/10/2018       To Whom it May Concern:    Apple Nieves is under my professional care  He was seen in my office on 8/10/2018  He may return to work on 8/13/18  Excuse from 7/30/18-8/13/18 for acute gout arthritis  If you have any questions or concerns, please don't hesitate to call           Sincerely,          MUKESH Carlisle        CC: No Recipients

## 2018-08-10 NOTE — PROGRESS NOTES
Assessment/Plan:    No problem-specific Assessment & Plan notes found for this encounter  Diagnoses and all orders for this visit:    Great toe pain, left  Comments:  uric acid level drawn to further evaluate for gouty arthritis, start colchicine daily as rx'd, consult entered to f/u with podiatry   Orders:  -     Cancel: Uric acid; Future  -     Ambulatory referral to Podiatry; Future  -     Uric acid  -     Uric acid  -     colchicine (COLCRYS) 0 6 mg tablet; Take 1 tablet (0 6 mg total) by mouth daily    Other orders  -     predniSONE 10 mg tablet;       FMLA forms completed and work note issued confirming work absence from 7/30-8/13  Subjective:      Patient ID: Gretchen Burns is a 28 y o  male here for an acute visit  States he has had a gout flare and has caused him to miss work the past 2 weeks  Unable to walk as a  6 days/week  States he typically walks 13 miles/day  Wonders if he can apply for [employer]  Went to ER last week and had x-ray and was given indomethacin  He then went to his pain management doctor who stopped indocin and put him on prednisone  Swelling has gotten better but still has pain in big toe joint  No symptoms in right foot  Hx of gout in left foot after uric acid level was elevated years ago  Has been controlling with diet and eliminating alcohol  The following portions of the patient's history were reviewed and updated as appropriate: allergies, current medications, past medical history, past social history and problem list     Review of Systems   Constitutional: Negative for chills and fever  Musculoskeletal: Positive for arthralgias and gait problem  Objective:      /84   Pulse (!) 108   Temp 98 5 °F (36 9 °C)   Ht 6' 2" (1 88 m)   Wt 136 kg (299 lb)   BMI 38 39 kg/m²        Physical Exam   Constitutional: He is oriented to person, place, and time  He appears well-developed and well-nourished  No distress     HENT:   Head: Normocephalic and atraumatic  Eyes: Conjunctivae are normal    Musculoskeletal:   Left great toe w/o swelling or erythema, exquisitely tender w/LROM due to pain   Limping gait    Neurological: He is alert and oriented to person, place, and time  Psychiatric: He has a normal mood and affect  Vitals reviewed

## 2018-08-10 NOTE — LETTER
August 10, 2018     Patient: Aracelis Shaw   YOB: 1985   Date of Visit: 8/10/2018       To Whom it May Concern:    Aracelis Shaw is under my professional care  He was seen in my office on 8/10/2018  He may return to work on 8/13/18  If you have any questions or concerns, please don't hesitate to call           Sincerely,          MUKESH Waite        CC: No Recipients

## 2018-08-10 NOTE — PATIENT INSTRUCTIONS
Gout   AMBULATORY CARE:   Gout  is a form of arthritis that causes severe joint pain and stiffness  Acute gout pain starts suddenly, gets worse quickly, and stops on its own  Acute gout can become chronic and cause permanent damage to your joints  Seek care immediately if:   · You have severe pain in one or more of your joints that you cannot tolerate  · You have a fever or redness that spreads beyond the joint area  Contact your healthcare provider if:   · You have new symptoms, such as a rash, after you start gout treatment  · Your joint pain and swelling do not go away, even after treatment  · You are not urinating as much or as often as you usually do  · You have trouble taking your gout medicines  · You have questions or concerns about your condition or care  Stages of gout:   · Hyperuricemia  starts with high levels of uric acid  Hyperuricemia is not gout, but it increases your risk for gout  You may have no symptoms at this stage, and it usually does not need treatment  · Acute gouty arthritis  starts with a sudden attack of pain and swelling, usually in 1 joint  The attack may last from a few days to 2 weeks  · Intercritical gout  is the time between attacks  You may go months or years without another attack  You will not have joint pain or stiffness, but this does not mean your gout is cured  You will still need treatment to prevent chronic gout  · Chronic tophaceous gout  develops if gout is not treated  Large amounts of uric acid crystals, called tophi, collect around your joints  The crystals can destroy or deform the joints  Gout attacks occur more often, and last hours to weeks  More than 1 joint may be painful and swollen  At this stage, gout symptoms do not go away on their own  Treatment  can make your symptoms stop sooner, prevent attacks, and decrease your risk of joint damage   You may need any of the following:  · Medicines:      ¨ NSAIDs , such as ibuprofen, help decrease swelling, pain, and fever  This medicine is available with or without a doctor's order  NSAIDs can cause stomach bleeding or kidney problems in certain people  If you take blood thinner medicine, always ask your healthcare provider if NSAIDs are safe for you  Always read the medicine label and follow directions  ¨ Gout medicine  decreases joint pain and swelling  It may also be given to prevent new gout attacks  ¨ Steroids  reduce inflammation and can help your joint stiffness and pain during gout attacks  ¨ Uric acid medicine  may be given to reduce the amount of uric acid your body makes  Some medicines may help you pass more uric acid when you urinate  ¨ Take your medicine as directed  Contact your healthcare provider if you think your medicine is not helping or if you have side effects  Tell him or her if you are allergic to any medicine  Keep a list of the medicines, vitamins, and herbs you take  Include the amounts, and when and why you take them  Bring the list or the pill bottles to follow-up visits  Carry your medicine list with you in case of an emergency  · Surgery  called a bone graft may be needed for tophi that are painful or infected  Bone in the joint may be replaced with bone taken from another place in your body  Ask your healthcare provider for more information about bone graft surgery  Manage gout:   · Rest your painful joint so it can heal   Your healthcare provider may recommend crutches or a walker if the affected joint is in a leg  · Apply ice to your joint  Ice decreases pain and swelling  Use an ice pack, or put crushed ice in a plastic bag  Cover the ice pack or bag with a towel before you apply it to your painful joint  Apply ice for 15 to 20 minutes every hour, or as directed  · Elevate your joint  Elevation helps reduce swelling and pain  Raise your joint above the level of your heart as often as you can   Prop your painful joint on pillows to keep it above your heart comfortably  · Go to physical therapy if directed  A physical therapist can teach you exercises to improve flexibility and range of motion  Prevent gout attacks:   · Do not eat high-purine foods  These foods include meats, seafood, asparagus, spinach, cauliflower, and some types of beans  Healthcare providers may tell you to eat more low-fat milk products, such as yogurt  Milk products may decrease your risk for gout attacks  Vitamin C and coffee may also help  Your healthcare provider or dietitian can help you create a meal plan  · Drink more liquids as directed  Liquids such as water help remove uric acid from your body  Ask how much liquid to drink each day and which liquids are best for you  · Manage your weight  Weight loss may decrease the amount of uric acid in your body  Exercise can help you lose weight  Talk to your healthcare provider about the best exercises for you  · Control your blood sugar level if you have diabetes  Keep your blood sugar level in a normal range  This can help prevent gout attacks  · Limit or do not drink alcohol as directed  Alcohol can trigger a gout attack  Alcohol also increases your risk for dehydration  Ask your healthcare provider if alcohol is safe for you  Follow up with your healthcare provider as directed:  Write down your questions so you remember to ask them during your visits  © 2017 [address] Information is for End User's use only and may not be sold, redistributed or otherwise used for commercial purposes  All illustrations and images included in CareNotes® are the copyrighted property of [employer]  or [employer]ul[employer] Pedro[employer]  The above information is an  only  It is not intended as medical advice for individual conditions or treatments  Talk to your doctor, nurse or pharmacist before following any medical regimen to see if it is safe and effective for you

## 2018-08-14 ENCOUNTER — TELEPHONE (OUTPATIENT)
Dept: FAMILY MEDICINE CLINIC | Facility: HOSPITAL | Age: 33
End: 2018-08-14

## 2018-08-14 NOTE — TELEPHONE ENCOUNTER
Only if the patient is not responding to med given  Please call and get an update on how he is doing

## 2018-08-14 NOTE — TELEPHONE ENCOUNTER
I called [facility] to follow up on his Uric Acid drawn in the office on Friday  They said that the type of tube used was correct, but was supposed to be spun down before sending (we dont have the capability to spin down that size tube in our office) so the specimen could not be resulted   Would you like to reorder this test to be done at [facility]?

## 2018-08-17 LAB — LABCORP COMMENT: NORMAL

## 2018-08-28 ENCOUNTER — OFFICE VISIT (OUTPATIENT)
Dept: FAMILY MEDICINE CLINIC | Facility: HOSPITAL | Age: 33
End: 2018-08-28
Payer: COMMERCIAL

## 2018-08-28 VITALS
WEIGHT: 294.8 LBS | HEIGHT: 74 IN | TEMPERATURE: 98.6 F | DIASTOLIC BLOOD PRESSURE: 80 MMHG | HEART RATE: 78 BPM | BODY MASS INDEX: 37.83 KG/M2 | SYSTOLIC BLOOD PRESSURE: 132 MMHG

## 2018-08-28 DIAGNOSIS — B34.9 VIRAL ILLNESS: Primary | ICD-10-CM

## 2018-08-28 PROCEDURE — 99213 OFFICE O/P EST LOW 20 MIN: CPT | Performed by: NURSE PRACTITIONER

## 2018-08-28 PROCEDURE — 3008F BODY MASS INDEX DOCD: CPT | Performed by: NURSE PRACTITIONER

## 2018-08-28 NOTE — PROGRESS NOTES
Assessment/Plan:     Normal exam  Symptoms likely viral    Work note given  Call with worsening or new symptoms or no improvement in 1 week  Diagnoses and all orders for this visit:    Viral illness          Subjective:     Patient ID: Areli Benitez is a 28 y o  male  Yesterday felt nauseated at work  Got worse as day went on  Reather Romelia asleep right after work  Bad HA  Clammy and warm  Abdominal pain with nausea in the middle of the night  Burning up  Did not check temp  No vomiting  Dry heaving  No diarrhea  Faint abdominal pain and still feels nauseated  Lightheaded  Took omeprazole this morning  Denies sore throat  Some neck stiffness  Some body aches  Denies nasal congestion, runny nose, cough  Denies sick contacts  No recent travel  No rash  Review of Systems   Constitutional: Positive for diaphoresis and fever  HENT: Negative for congestion, ear pain, rhinorrhea and sore throat  Respiratory: Negative for cough  Gastrointestinal: Positive for abdominal pain and nausea  Negative for diarrhea and vomiting  Musculoskeletal: Positive for neck stiffness  Negative for arthralgias and myalgias  Neurological: Positive for light-headedness and headaches  The following portions of the patient's history were reviewed and updated as appropriate: allergies, current medications, past family history, past medical history, past social history, past surgical history and problem list     Objective:  Vitals:    08/28/18 1002   BP: 132/80   Pulse: 78   Temp: 98 6 °F (37 °C)      Physical Exam   Constitutional: He is oriented to person, place, and time  He appears well-developed and well-nourished     HENT:   Right Ear: Tympanic membrane, external ear and ear canal normal    Left Ear: Tympanic membrane, external ear and ear canal normal    Mouth/Throat: Uvula is midline, oropharynx is clear and moist and mucous membranes are normal    Cardiovascular: Normal rate, regular rhythm and normal heart sounds  No murmur heard  Pulmonary/Chest: Effort normal and breath sounds normal    Abdominal: Soft  Normal appearance and bowel sounds are normal  There is no hepatosplenomegaly  Obese abdomen   Lymphadenopathy:     He has no cervical adenopathy  Neurological: He is alert and oriented to person, place, and time  Skin: Skin is warm and dry  Psychiatric: He has a normal mood and affect  Vitals reviewed

## 2018-08-28 NOTE — LETTER
August 28, 2018     Patient: Lynann Aase   YOB: 1985   Date of Visit: 8/28/2018       To Whom it May Concern:    Lynann Aase is under my professional care  He was seen in my office on 8/28/2018  He may return to work on 8/31/18  If you have any questions or concerns, please don't hesitate to call           Sincerely,          MUKESH Pagan        CC: No Recipients

## 2018-12-08 ENCOUNTER — APPOINTMENT (EMERGENCY)
Dept: RADIOLOGY | Facility: HOSPITAL | Age: 33
End: 2018-12-08
Payer: COMMERCIAL

## 2018-12-08 ENCOUNTER — HOSPITAL ENCOUNTER (EMERGENCY)
Facility: HOSPITAL | Age: 33
Discharge: HOME | End: 2018-12-08
Attending: EMERGENCY MEDICINE | Admitting: EMERGENCY MEDICINE
Payer: COMMERCIAL

## 2018-12-08 VITALS
DIASTOLIC BLOOD PRESSURE: 75 MMHG | OXYGEN SATURATION: 97 % | BODY MASS INDEX: 36.06 KG/M2 | SYSTOLIC BLOOD PRESSURE: 148 MMHG | RESPIRATION RATE: 20 BRPM | WEIGHT: 290 LBS | HEIGHT: 75 IN | HEART RATE: 80 BPM | TEMPERATURE: 97.8 F

## 2018-12-08 DIAGNOSIS — S93.401A RIGHT ANKLE SPRAIN: Primary | ICD-10-CM

## 2018-12-08 PROCEDURE — 73610 X-RAY EXAM OF ANKLE: CPT

## 2018-12-08 PROCEDURE — 99283 EMERGENCY DEPT VISIT LOW MDM: CPT

## 2018-12-08 NOTE — DISCHARGE INSTRUCTIONS

## 2018-12-08 NOTE — ED NOTES
Pt  Discharged home  Air splint applied to right ankle and crutches provided; pt  Demonstrated crutch use competently  Left ED with family member         Kya Jensen RN  12/08/18 6816

## 2018-12-09 NOTE — ED PROVIDER NOTES
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 Taking? buprenorphine (SUBUTEX) 8 mg   Yes No   Sig: 3 (three) times a day     omeprazole (PriLOSEC) 40 MG capsule   Yes No   Sig: Take 1 capsule by mouth      Facility-Administered Medications: None       Past Medical History:   Diagnosis Date    Acid reflux disease     Gout        Past Surgical History:   Procedure Laterality Date    WISDOM TOOTH EXTRACTION         Family History   Problem Relation Age of Onset   [facility] Breast cancer Mother     Other Mother         degenerative disc disease     Diabetes Father     Diabetes Sister     Migraines Sister      I have reviewed and agree with the history as documented  Social History   Substance Use Topics    Smoking status: Current Every Day Smoker     Packs/day: 0 50    Smokeless tobacco: Never Used      Comment: thi some days smoker per allscripts     Alcohol use No        Review of Systems   Constitutional: Negative for fever  Musculoskeletal: Positive for arthralgias  Negative for gait problem, joint swelling and myalgias  Skin: Negative for color change and wound  Neurological: Negative for weakness and numbness         Physical Exam  Physical Exam   Constitutional: He is oriented to person, place, and time  He appears well-developed and well-nourished  No distress  HENT:   Head: Normocephalic and atraumatic  Eyes: Pupils are equal, round, and reactive to light  No scleral icterus  Neck: No JVD present  Cardiovascular: Normal rate and regular rhythm  Exam reveals no gallop and no friction rub  No murmur heard  Pulmonary/Chest: No respiratory distress  He has no wheezes  He has no rales  Musculoskeletal:        Right ankle: He exhibits normal range of motion, no swelling, no ecchymosis and no deformity  Tenderness (just inferior to medial malleolus)  No lateral malleolus, no medial malleolus, no head of 5th metatarsal and no proximal fibula tenderness found  Achilles tendon exhibits normal Simpson's test results  Right foot: There is normal range of motion and no bony tenderness  Neurological: He is alert and oriented to person, place, and time  Skin: Skin is warm and dry  He is not diaphoretic  Vitals reviewed        Vital Signs  ED Triage Vitals   Temperature Pulse Respirations Blood Pressure SpO2   12/08/18 1717 12/08/18 1717 12/08/18 1717 12/08/18 1715 12/08/18 1717   97 8 °F (36 6 °C) 77 20 (!) 183/92 97 %      Temp Source Heart Rate Source Patient Position - Orthostatic VS BP Location FiO2 (%)   12/08/18 1717 12/08/18 1717 12/08/18 1717 12/08/18 1717 --   Temporal Monitor Lying Right arm       Pain Score       12/08/18 1717       7           Vitals:    12/08/18 1715 12/08/18 1717 12/08/18 1845   BP: (!) 183/92 (!) 183/92 148/75   Pulse:  77 80   Patient Position - Orthostatic VS:  Lying        Visual Acuity      ED Medications  Medications - No data to display    Diagnostic Studies  Results Reviewed     None                 XR ankle 3+ views RIGHT    (Results Pending)              Procedures  Procedures       Phone Contacts  ED Phone Contact    ED Course                               MDM  Number of Diagnoses or Management Options  Right ankle sprain:   Diagnosis management comments: [de-identified] for old male presents with acute inversion injury to the right ankle  Physical exam is relatively benign  X-rays reveal no evidence of acute fracture  Patient is placed in Aircast splint and given crutches for ambulatory assistance, referred orthopedics follow-up should pain not improve  Amount and/or Complexity of Data Reviewed  Tests in the radiology section of CPT®: ordered and reviewed  Review and summarize past medical records: yes  Independent visualization of images, tracings, or specimens: yes      CritCare Time    Disposition  Final diagnoses:   Right ankle sprain     Time reflects when diagnosis was documented in both MDM as applicable and the Disposition within this note     Time User Action Codes Description Comment    12/8/2018  6:19 PM Jaclyn Fletcher Add [S95 401A] Right ankle sprain       ED Disposition     ED Disposition Condition Comment    Discharge  Ji Evener discharge to home/self care  Condition at discharge: Good        Follow-up Information     Follow up With Specialties Details Why Contact Info Additional [id number] [facility] Orthopedic Surgery In 1 week If symptoms worsen [address] [facility]  [phone] [address] [facility], [address] [place] [address], [phone]          Discharge Medication List as of 12/8/2018  6:19 PM      CONTINUE these medications which have NOT CHANGED    Details   buprenorphine (SUBUTEX) 8 mg 3 (three) times a day  , Starting Thu 4/19/2018, Historical Med      omeprazole (PriLOSEC) 40 MG capsule Take 1 capsule by mouth, Starting Wed 5/11/2016, Historical Med           No discharge procedures on file      ED Provider  Electronically Signed by           Maribel James PA-C  12/08/18 6052

## 2018-12-12 ENCOUNTER — HOSPITAL ENCOUNTER (EMERGENCY)
Facility: HOSPITAL | Age: 33
Discharge: HOME | End: 2018-12-12
Attending: EMERGENCY MEDICINE | Admitting: EMERGENCY MEDICINE
Payer: COMMERCIAL

## 2018-12-12 VITALS
DIASTOLIC BLOOD PRESSURE: 75 MMHG | RESPIRATION RATE: 18 BRPM | BODY MASS INDEX: 36.06 KG/M2 | TEMPERATURE: 96.6 F | SYSTOLIC BLOOD PRESSURE: 169 MMHG | HEART RATE: 75 BPM | HEIGHT: 75 IN | WEIGHT: 290 LBS | OXYGEN SATURATION: 96 %

## 2018-12-12 DIAGNOSIS — S93.401D SPRAIN OF RIGHT ANKLE, UNSPECIFIED LIGAMENT, SUBSEQUENT ENCOUNTER: Primary | ICD-10-CM

## 2018-12-12 PROCEDURE — 99283 EMERGENCY DEPT VISIT LOW MDM: CPT

## 2018-12-12 NOTE — ED PROVIDER NOTES
History  No chief complaint on file  This 71-year-old man with history of gout and GERD states he inverted his ankle 1 week ago  Seen here on 12/8/18  The x-ray showed no acute fracture  Patient placed in Aircast and given crutches  Patient called orthopedics but has been unable to be seen there yet  He states it is still too painful to ambulate and is asking for a work note until he can be seen by Orthopedics  He is a   He states he has an appointment with orthopod on 12/13/18  Prior to Admission Medications   Prescriptions Last Dose Informant Patient Reported? Taking? buprenorphine (SUBUTEX) 8 mg   Yes No   Sig: 3 (three) times a day     omeprazole (PriLOSEC) 40 MG capsule   Yes No   Sig: Take 1 capsule by mouth      Facility-Administered Medications: None       Past Medical History:   Diagnosis Date    Acid reflux disease     Gout        Past Surgical History:   Procedure Laterality Date    WISDOM TOOTH EXTRACTION         Family History   Problem Relation Age of Onset   [facility] Breast cancer Mother     Other Mother         degenerative disc disease     Diabetes Father     Diabetes Sister     Migraines Sister      I have reviewed and agree with the history as documented  Social History   Substance Use Topics    Smoking status: Current Every Day Smoker     Packs/day: 0 50    Smokeless tobacco: Never Used      Comment: thi some days smoker per allscripts     Alcohol use No        Review of Systems   Constitutional: Negative  HENT: Negative  Eyes: Negative  Respiratory: Negative  Cardiovascular: Negative  Gastrointestinal: Negative  Endocrine: Negative  Genitourinary: Negative  Musculoskeletal: Negative  Skin: Negative  Allergic/Immunologic: Negative  Neurological: Negative  Hematological: Negative  Psychiatric/Behavioral: Negative  All other systems reviewed and are negative        Physical Exam  Physical Exam   Constitutional: He is oriented to person, place, and time  He appears well-developed and well-nourished  HENT:   Head: Normocephalic and atraumatic  Right Ear: External ear normal    Left Ear: External ear normal    Mouth/Throat: Oropharynx is clear and moist    Eyes: Pupils are equal, round, and reactive to light  Conjunctivae and EOM are normal    Neck: Normal range of motion  Neck supple  No JVD present  Cardiovascular: Normal rate, regular rhythm, normal heart sounds and intact distal pulses  No murmur heard  Pulmonary/Chest: Effort normal and breath sounds normal    Abdominal: Soft  Bowel sounds are normal  He exhibits no mass  There is no rebound and no guarding  Musculoskeletal: Normal range of motion  He exhibits tenderness  He exhibits no edema  Lymphadenopathy:     He has no cervical adenopathy  Neurological: He is alert and oriented to person, place, and time  He has normal reflexes  No cranial nerve deficit  Coordination normal    Skin: Skin is warm and dry  No rash noted  Psychiatric: He has a normal mood and affect  His behavior is normal    Nursing note and vitals reviewed  Vital Signs  ED Triage Vitals   Temp Pulse Resp BP SpO2   -- -- -- -- --      Temp src Heart Rate Source Patient Position - Orthostatic VS BP Location FiO2 (%)   -- -- -- -- --      Pain Score       --           There were no vitals filed for this visit      Visual Acuity      ED Medications  Medications - No data to display    Diagnostic Studies  Results Reviewed     None                 No orders to display              Procedures  Procedures       Phone Contacts  ED Phone Contact    ED Course                               MDM  Number of Diagnoses or Management Options  Sprain of right ankle, unspecified ligament, subsequent encounter: established and improving     Amount and/or Complexity of Data Reviewed  Review and summarize past medical records: yes      CritCare Time    Disposition  Final diagnoses:   None     ED Disposition     None      Follow-up Information    None         Patient's Medications   Discharge Prescriptions    No medications on file     No discharge procedures on file      ED Provider  Electronically Signed by           Orly Alegre DO  12/12/18 0498

## 2018-12-12 NOTE — ED NOTES
Pt was has appt w Ortho on Thursday  And only has work note till today  Pt is requesting another work note to have off till Thursday        Susan Porras RN  12/12/18 5744

## 2018-12-12 NOTE — DISCHARGE INSTRUCTIONS

## 2018-12-13 ENCOUNTER — OFFICE VISIT (OUTPATIENT)
Dept: OBGYN CLINIC | Facility: CLINIC | Age: 33
End: 2018-12-13
Payer: OTHER MISCELLANEOUS

## 2018-12-13 ENCOUNTER — VBI (OUTPATIENT)
Dept: ADMINISTRATIVE | Facility: OTHER | Age: 33
End: 2018-12-13

## 2018-12-13 VITALS
WEIGHT: 290 LBS | BODY MASS INDEX: 36.06 KG/M2 | SYSTOLIC BLOOD PRESSURE: 159 MMHG | DIASTOLIC BLOOD PRESSURE: 83 MMHG | HEART RATE: 93 BPM | HEIGHT: 75 IN

## 2018-12-13 DIAGNOSIS — S93.421A SPRAIN OF DELTOID LIGAMENT OF RIGHT ANKLE, INITIAL ENCOUNTER: Primary | ICD-10-CM

## 2018-12-13 PROCEDURE — 99203 OFFICE O/P NEW LOW 30 MIN: CPT | Performed by: ORTHOPAEDIC SURGERY

## 2018-12-13 NOTE — PROGRESS NOTES
RABIA Roads  Attending, Orthopaedic Surgery  [facility] [address] [facility] [address] [facility]        ORTHOPAEDIC FOOT AND ANKLE CLINIC VISIT     Assessment:     Encounter Diagnosis   Name Primary?  Sprain of deltoid ligament of right ankle, initial encounter Yes              Plan:   · The patient verbalized understanding of exam findings and treatment plan  We engaged in the shared decision-making process and treatment options were discussed at length with the patient  Surgical and conservative management discussed today along with risks and benefits  · He is to start physical therapy  · He is to wear an ASO brace  · He may return to light duty/ desk duty if available, otherwise he is to remain out of work until seen back in the office x 2 weeks  · Activity to tolerance  Return in about 2 weeks (around 12/27/2018) for Recheck right ankle deltoid ligament sprain  [place] History of Present Illness:   Chief Complaint:   Chief Complaint   Patient presents with    Right Ankle - Pain     Abril Serafin is a 28 y o  male who is being seen for right anterior medial ankle and medial foot pain x 1 week  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  Pain is localized at deltoid ligament with minimal radiating and described as sharp and severe  Patient denies numbness, tingling or radicular pain  Denies history of neuropathy  Patient does not smoke, does not have diabetes and does not take blood thinners  Patient denies family history of anesthesia complications and has not had any complications with anesthesia       Pain/symptom timing:  Worse during the day when active  Pain/symptom context:  Worse with activites and work  Pain/symptom modifying factors:  Rest makes better, activities make worse  Pain/symptom associated signs/symptoms: none    Prior treatment   · NSAIDsYes    · Injections No   · Bracing/Orthotics Yes   · Physical Therapy No     Orthopedic Surgical History:   none    Past Medical, Surgical and Social History:  Past Medical History:  has a past medical history of Acid reflux disease and Gout  Problem List:  does not have a problem list on file  Past Surgical History:  has a past surgical history that includes [place] tooth extraction  Family History: family history includes Breast cancer in his mother; Diabetes in his father and sister; Migraines in his sister; Other in his mother  Social History:  reports that he has been smoking  He has been smoking about 0 25 packs per day  He has never used smokeless tobacco  He reports that he does not drink alcohol or use drugs  Current Medications: has a current medication list which includes the following prescription(s): buprenorphine and omeprazole  Allergies: has No Known Allergies  Review of Systems:  General- denies fever/chills  HEENT- denies hearing loss or sore throat  Eyes- denies eye pain or visual disturbances, denies red eyes  Respiratory- denies cough or SOB  Cardio- denies chest pain or palpitations  GI- denies abdominal pain  Endocrine- denies urinary frequency  Urinary- denies pain with urination  Musculoskeletal- Negative except noted above  Skin- denies rashes or wounds  Neurological- denies dizziness or headache  Psychiatric- denies anxiety or difficulty concentrating    Physical Exam:   /83 (BP Location: Left arm, Patient Position: Sitting, Cuff Size: Adult)   Pulse 93   Ht 6' 3" (1 905 m)   Wt 132 kg (290 lb)   BMI 36 25 kg/m²   General/Constitutional: No apparent distress: well-nourished and well developed  Eyes: normal ocular motion  Lymphatic: No appreciable lymphadenopathy  Respiratory: Non-labored breathing  Vascular: No edema, swelling or tenderness, except as noted in detailed exam   Integumentary: No impressive skin lesions present, except as noted in detailed exam   Neuro: No ataxia or tremors noted  Psych: Normal mood and affect, oriented to person, place and time  Appropriate affect  Musculoskeletal: Normal, except as noted in detailed exam and in HPI  Examination    Right    Gait He is non weight bearing in a air cast,  ace wrap, with crutch assistance  Musculoskeletal Tender to palpation at deltoid ligament    Skin Normal      Nails Normal    Range of Motion  15 degrees dorsiflexion, 30 degrees plantarflexion  Subtalar motion: normal    Stability Stable    Muscle Strength 5/5 tibialis anterior  5/5 gastrocnemius-soleus  4/5 posterior tibialis  4/5 peroneal/eversion strength  5/5 EHL  5/5 FHL    Neurologic Normal    Sensation Intact to light touch throughout sural, saphenous, superficial peroneal, deep peroneal and medial/lateral plantar nerve distributions  [place]-May[place] 5 07 filament (10g) testing deferred  Cardiovascular Brisk capillary refill < 2 seconds,intact DP and PT pulses    Special Tests None      Imaging Studies:   3 views of the right ankle  were taken, reviewed and interpreted independently that demonstrate no fracture or dislocation present  Reviewed by me personally  Scribe Attestation    I,:   Xiomara Javed am acting as a scribe while in the presence of the attending physician :        I,:   Doyle Sharma MD personally performed the services described in this documentation    as scribed in my presence :              Christyne Kemps Lachman, MD  Foot & Ankle Surgery   Department of [address] [place] [address]      I personally performed the service  Christyne Kemps Lachman, MD

## 2018-12-13 NOTE — PROGRESS NOTES
RABIA Singh  Attending, Orthopaedic Surgery  Foot and Ankle  [facility]      Assessment:   No diagnosis found  Plan:     The patient has sustained a sprain of the {LEFT/RIGHT:64179} ATFL and possibly CFL  We will begin physical therapy as soon as the patient tolerates- Order placed  Instructions for Home stretching program provided in AVSS  Instructions given for rest, ice 20mins/hr, elevation, and Ace wrap given for compression  The patient was prescribed a {With/without:39745} to be worn at all times while not in PT  Work/School restrictions given      Follow up will be in {1-6:88025} weeks  Basia Naval        Subjective:    Chief Complaint:  Pain of the Right Ankle       Cassandra Reis is a 28 y o  male {Referred by:03000} for evaluation and treatment of an injury to the {LEFT/RIGHT/BI:40253} ankle  This is evaluated as a {personal injury, XO:40889} injury  The injury occurred {numbers 0-10:95513} {units:11} ago, and occurred while ***  The patient states the ankle rolled ***galeana at the time of injury  He {did/not:23893} hear or sense a pop or snap at the time of the injury  The patient notes pain and {SEVERITY:5014} swelling of the ankle since the injury  He has treated the ankle with {Therapies; injury:78153}  Pain is localized to the {Desc; anatomical:00234}  malleolar area  He {has/not:80777} sprained this ankle in the past     Pain/symptom location: {ANATOMY; ANKLE/FOOT/RIGHT/LEFT/BOTH:91784}  Pain/symptom quality: {PAIN QUALITY:32128}  Pain/symptom severity: {Desc; pain severity:98590}  Pain/symptom timing:  Worse during the day when active  Pain/symptom conext:  Worse with activites and work  Pain/symptom modifying factors:  Rest makes better, activities make worse  Pain/symptom associated signs/symptoms: none    Outside reports reviewed: {Outside review:05573}      Foot and Ankle Surgical History:   {select as applicable:76673}    Past Medical, Surgical and Social History:  Past Medical History:  has a past medical history of Acid reflux disease and Gout  Problem List:  does not have a problem list on file  Past Surgical History:  has a past surgical history that includes [place] tooth extraction  Family History: family history includes Breast cancer in his mother; Diabetes in his father and sister; Migraines in his sister; Other in his mother  Social History:  reports that he has been smoking  He has been smoking about 0 25 packs per day  He has never used smokeless tobacco  He reports that he does not drink alcohol or use drugs  Current Medications: has a current medication list which includes the following prescription(s): buprenorphine and omeprazole  Allergies: has No Known Allergies  Review of Systems:  General- denies fever/chills  HEENT- denies hearing loss or sore throat  Eyes- denies eye pain or visual disturbances, denies red eyes  Respiratory- denies cough or SOB  Cardio- denies chest pain or palpitations  GI- denies abdominal pain  Endocrine- denies urinary frequency  Urinary- denies pain with urination  Musculoskeletal- Negative except noted above  Skin- denies rashes or wounds  Neurological- denies dizziness or headache  Psychiatric- denies anxiety or difficulty concentrating    Objective:        /83 (BP Location: Left arm, Patient Position: Sitting, Cuff Size: Adult)   Pulse 93   Ht 6' 3" (1 905 m)   Wt 132 kg (290 lb)   BMI 36 25 kg/m²   General/Constitutional: No apparent distress: well-nourished and well developed  Eyes: normal ocular motion  Lymphatic: No appreciable lymphadenopathy  Respiratory: Non-labored breathing  Vascular: No edema, swelling or tenderness, except as noted in detailed exam   Integumentary: No impressive skin lesions present, except as noted in detailed exam   Neuro: No ataxia or abnormal movements  Psych: Normal mood and affect, oriented to person, place and time    MSK: normal other than stated in HPI and exam      Gait:  {gait:537078::"Normal"}  The patient {can/cannot:78130} bear weight on the injured extremity  {Right:5607} Ankle  Proximal Fibula:   no tenderness noted   Edema: Moderate swelling circumferentially in the ankle   Ecchymosis:   Moderate in lateral ankle   Crepitus  None   Active ROM:  50% of normal    Passive ROM:   75% of normal    Palpation:  Significant tenderness of the anterolateral ligaments   Stability :   No joint laxity  Drawer sign equal to unaffected ankle  Syndosmosis:   syndesmotic ligament IS NOT tender   Sensation:    Intact in all distributions   Pulses:  normal DP and PT pulses       Imaging  X-ray of the ankle/foot: 3 views of the ankle reveal a stable mortise joint, moderate soft tissue swelling, and *** evidence of fracture  Reviewed by me personally  I personally performed this service    Jose J Vergara

## 2018-12-14 NOTE — TELEPHONE ENCOUNTER
Doyle Norwood    ED Visit Information     Ed visit date: 12/08 and 12/12/2018  Diagnosis Description: Right ankle sprain  In Network? Yes [facility]  Discharge status: Home  Discharged with meds ? No  Number of ED visits to date: 3  ED Severity:4     Outreach Information    Outreach successful: Yes 2  Date letter mailed:N/a  Date Finalized:12/14/2018    Care Coordination    Follow up appointment with specialilty: yes 12/13/2018   Transportation issues ? No    Value [employer] type:  7 Day Outreach  Emergent necessity warranted by diagnosis:  No  [facility] PCP:  Yes  Transportation:  Friend/Family Transport  Called PCP first?:  No  Feels able to call PCP for urgent problems ?:  Yes  Understands what emergencies can be handled by PCP ?:  Yes  Ever any problems getting appointment with PCP for minor emergency/urgency problems?:  No  Practice Contacted Patient ?:  No  Pt had ED follow up with pcp/staff ?:  No    Seen for follow-up out of network ?:  No  Reason Patient went to ED instead of Urgent Care or PCP?:  Work injury  Urgent care Education?:  Yes  12/13/2018 02:36 PM Phone (Allyn Lagos Destiny Lat (Self) [phone] (H)   Left Message - Requesting a call back    Unable to reach patient in regard to recent ED visit on 12/8 and 12/12 for Right ankle sprain    12/14/2018 09:42 AM Phone (Allyn Lagos Destiny Lat (Self) [phone] (H)   Call Complete    Personal communication with patient in regard to her recent ED visit on 12/08 for Right ankle sprain  Patient was discharged without medication and advised to follow up with orthopedics within 7 days of visit  Norbert Rj declined to schedule a follow up appt with their PCP at this time  He stated that he is doing well  He does not meet OPCM, denies any transportation issues and is aware of her nearest [place] [address] urgent care facility, education given

## 2018-12-18 ENCOUNTER — TELEPHONE (OUTPATIENT)
Dept: OBGYN CLINIC | Facility: HOSPITAL | Age: 33
End: 2018-12-18

## 2018-12-18 NOTE — TELEPHONE ENCOUNTER
Caller: patient  Call back number: [phone]  Patient's doctor: Dr Manju Aparicio    Patient called stating that his work is requesting a letter stating what the injury is, a diagnosis, and explanation of how this would be caused by his work injury  He will pick this up when ready

## 2018-12-20 ENCOUNTER — EVALUATION (OUTPATIENT)
Dept: PHYSICAL THERAPY | Facility: CLINIC | Age: 33
End: 2018-12-20
Payer: COMMERCIAL

## 2018-12-20 DIAGNOSIS — S93.421A SPRAIN OF DELTOID LIGAMENT OF RIGHT ANKLE, INITIAL ENCOUNTER: Primary | ICD-10-CM

## 2018-12-20 PROCEDURE — 97161 PT EVAL LOW COMPLEX 20 MIN: CPT

## 2018-12-20 PROCEDURE — G8979 MOBILITY GOAL STATUS: HCPCS

## 2018-12-20 PROCEDURE — G8978 MOBILITY CURRENT STATUS: HCPCS

## 2018-12-20 NOTE — PROGRESS NOTES
PT Evaluation     Today's date: 2018  Patient name: Sandhya Zuñiga  : 1985  MRN: [id number]  Referring provider: Cyrus Henderson MD  Dx:   Encounter Diagnosis     ICD-10-CM    1  Sprain of deltoid ligament of right ankle, initial encounter E07 809O Ambulatory referral to Physical Therapy       Start Time: 0900  Stop Time: 1000  Total time in clinic (min): 60 minutes    Assessment  Assessment details: Patient is a 27 yo male presenting with symptoms consistent with R ankle deltoid sprain on 18  Patient noted this injury happened at work  Patient presents with decreased R ankle AROM and PROM, decreased R ankle strength and endurance and gait deviations  Patient currently amb without an AD with a ace bandage and requires a HHA for stairs  Patient performed balance testing with increased pain when fully weight bearing  Patient has pain with prolonged walking and standing, walking on uneven surfaces, and stairs  PT will address the stated impairments by performing eccentric strengthening, stretching, ROM exercises, balance exercises and manual techniques such as STM and mobilizations to allow the patient to return to his PLOF  PT recommended 2x/week for 4-6 weeks c a good prognosis 2* PLOF  Impairments: abnormal gait, abnormal or restricted ROM, activity intolerance, impaired balance, impaired physical strength, lacks appropriate home exercise program, pain with function, safety issue, weight-bearing intolerance and poor posture     Symptom irritability: moderateUnderstanding of Dx/Px/POC: good   Prognosis: good    Goals  STG: In four weeks the patient will:    1  Be (I) with his HEP  2  Increase R ankle strength to 4+/5 MMT score to assist c stairs and walking  3  Increase R ankle ROM by 10 degrees each way to assist c push off during gait and stairs  LTG: In six weeks, the patient will:    1  Increase FOTO score to 67 to demonstrate improvements in symptoms and function    2  Demonstrate full R ankle AROM without pain  3  Perform 60 mins of activity without pain  4  Increase R ankle strength to 5/5 MMT score to assist c prolonged walking and working activities  5  Participate in a half day of work without R ankle pain  6  Participate in a full day of work without R ankle pain  Plan  Patient would benefit from: skilled physical therapy  Referral necessary: No  Planned modality interventions: cryotherapy and thermotherapy: hydrocollator packs  Planned therapy interventions: abdominal trunk stabilization, balance, balance/weight bearing training, functional ROM exercises, home exercise program, therapeutic exercise, therapeutic activities, stretching, strengthening, postural training, patient education, neuromuscular re-education, massage, Jean taping, manual therapy and joint mobilization  Frequency: 2x week  Duration in visits: 12  Duration in weeks: 6  Plan of Care beginning date: 2018  Plan of Care expiration date: 2019  Treatment plan discussed with: patient        Subjective Evaluation    History of Present Illness  Date of onset: 2018  Mechanism of injury: Patient noted he fell 18 after stepping off a landing and rolled the R ankle at work  Patient went to the ER and noted a negative x-ray  Patient was provided an aircast and crutches  At the time of the evaluation, the patient is amb without crutches and an ace bandage  Patient noted the most pain when descending the stairs and lifting the R LE off the ground  Patient is able to walk on an even ground for 45 mins  Patient noted uneven surfaces are a challenge at times  Patient always uses railings to ascend and descend the stairs, even before the fall     Not a recurrent problem   Quality of life: good    Pain  Current pain ratin  At best pain ratin  At worst pain ratin (after a night of walking)  Location: Medial R ankle, arch  Quality: sharp and dull ache (No numbness or tingling in the R LE)  Relieving factors: rest, ice and medications  Aggravating factors: standing, walking, stair climbing and lifting  Progression: improved    Social Support  Steps to enter house: yes (1 ELIDA)  Stairs in house: yes (full flight to bedroom)   Lives in: multiple-level home  Lives with: young children and spouse (2 month old puppy)    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))  Hand dominance: left  Exercise history: 28,000 steps per day =15 miles a day at work prior to ankle sprain      Diagnostic Tests  X-ray: normal (no fx)  Treatments  Discharged from (in last 30 days) comments: ED 12/8/18  Patient Goals  Patient goals for therapy: decreased pain, improved balance, increased motion, return to work, return to [employer] activities, independence with ADLs/IADLs and increased strength  Patient goal: "want to get back on track "        Objective     Static Posture     Ankle/Foot   Ankle/Foot (Left): Calcaneovarus and pes planus  Ankle/Foot (Right): Calcaneovarus and pes planus  Comments  FTEO: 30 seconds on firm surface no deviations  FTEC: 30 seconds, mild deviaitons    Tandem R back: 20 seconds pain  Tandem L back: 30 seconds pain and moderate deviations        Observations     Right Ankle/Foot   Negative for edema  Palpation     Right Tenderness of the plantaris and posterior tibialis  Right Ankle/Foot Comments  Right posterior tibialis: Distal        Tenderness     Right Ankle/Foot   Tenderness in the deltoid ligament, mid-plantar aspect, navicular and posterior tibial tendon       Neurological Testing     Sensation     Ankle/Foot   Left Ankle/Foot   Intact: light touch    Right Ankle/Foot   Intact: light touch     Active Range of Motion   Left Ankle/Foot   Dorsiflexion (ke): 10 degrees   Plantar flexion: 50 degrees   Inversion: 30 degrees   Eversion: 20 degrees     Right Ankle/Foot   Dorsiflexion (ke): 5 degrees   Plantar flexion: 15 degrees   Inversion: 5 degrees   Eversion: 10 degrees     Passive Range of Motion   Left Ankle/Foot  Normal passive range of motion    Right Ankle/Foot    Dorsiflexion (ke): 8 degrees   Plantar flexion: 23 degrees   Inversion: 8 degrees   Eversion: 15 degrees     Strength/Myotome Testing     Left Hip   Planes of Motion   Flexion: 5  Extension: 5  Abduction: 5  Adduction: 4+  External rotation: 4+  Internal rotation: 4+    Right Hip   Planes of Motion   Flexion: 5  Extension: 5  Abduction: 5  Adduction: 4+  External rotation: 4  Internal rotation: 4    Left Ankle/Foot   Dorsiflexion: 4+  Plantar flexion: 4+  Inversion: 4+  Eversion: 4+  Great toe flexion: 4+  Great toe extension: 4+    Right Ankle/Foot   Dorsiflexion: 3+  Plantar flexion: 3+  Inversion: 3+  Eversion: 3+  Great toe flexion: 4-  Great toe extension: 4-    Additional Strength Details  Knee flexion: 4/5 (B)  Knee extension: 5/5  Patient performed 6/10 HR on the R and 10/10 HR on the L         Flowsheet Rows      Most Recent Value   PT/OT G-Codes   Current Score  46   Projected Score  67   FOTO information reviewed  Yes   Assessment Type  Evaluation   G code set  Mobility: Walking & Moving Around   Mobility: Walking and Moving Around Current Status ()  CK   Mobility: Walking and Moving Around Goal Status ()  CJ          Precautions: LBP at times, R ankle sprain on 12/6/18    Daily Treatment Diary     Manual  12/20            STM to R ankle nv            R ankle PROM nv            R ankle mobilizations nv                                          Exercise Diary  12/20            Bike nv            Ankle ABC nv            BAPS board (Fwd/bwd, CW/ CCW) nv            Towel crunches nv            Arch lifts nv            Ankel 4 way nv            Gastroc/soleus stretch nv                         clams nv            Bridge with hip add nv            Bridge with hip abd nv                         Tandem on foam nv            SLS on floor nv            Tandem walking on beam nv Modalities  12/20            CP PRN np

## 2018-12-27 ENCOUNTER — OFFICE VISIT (OUTPATIENT)
Dept: OBGYN CLINIC | Facility: CLINIC | Age: 33
End: 2018-12-27
Payer: OTHER MISCELLANEOUS

## 2018-12-27 ENCOUNTER — OFFICE VISIT (OUTPATIENT)
Dept: PHYSICAL THERAPY | Facility: CLINIC | Age: 33
End: 2018-12-27
Payer: COMMERCIAL

## 2018-12-27 VITALS
BODY MASS INDEX: 36.06 KG/M2 | HEIGHT: 75 IN | HEART RATE: 103 BPM | DIASTOLIC BLOOD PRESSURE: 94 MMHG | WEIGHT: 290 LBS | SYSTOLIC BLOOD PRESSURE: 162 MMHG

## 2018-12-27 DIAGNOSIS — S93.421D SPRAIN OF DELTOID LIGAMENT OF RIGHT ANKLE, SUBSEQUENT ENCOUNTER: Primary | ICD-10-CM

## 2018-12-27 DIAGNOSIS — S93.421A SPRAIN OF DELTOID LIGAMENT OF RIGHT ANKLE, INITIAL ENCOUNTER: Primary | ICD-10-CM

## 2018-12-27 PROCEDURE — 97140 MANUAL THERAPY 1/> REGIONS: CPT

## 2018-12-27 PROCEDURE — 97110 THERAPEUTIC EXERCISES: CPT

## 2018-12-27 PROCEDURE — 99213 OFFICE O/P EST LOW 20 MIN: CPT | Performed by: ORTHOPAEDIC SURGERY

## 2018-12-27 NOTE — PROGRESS NOTES
Daily Note     Today's date: 2018  Patient name: Zach Martin  : 1985  MRN: [id number]  Referring provider: Jania Raymond MD  Dx:   Encounter Diagnosis     ICD-10-CM    1  Sprain of deltoid ligament of right ankle, initial encounter [id number]                   Subjective: Pt  Stated he has been trying to perform HEP but many given are very painful  He also stated insidious onset of increased pain on   He stated he had been feeling better with slight improvement until then but there was no cause or reason for the increased pain  Objective: See treatment diary below      Assessment: Tolerated treatment poor  Patient demonstrated fatigue post treatment and would benefit from continued PT  Initiated PT POC today  Pt  Presented with hypersensitivity and pain on medial aspect of R ankle into the arch of R foot  No swelling or discoloration present  Pt  Was not very tolerant of any of the TE's given and PROM was minimal due to pain  Trialed IASTM for desensitization purposes- pt  Had low to fair tolerance to IASTM but reported feeling better       Plan: Continue per plan of care  Progress treatment as tolerated            Precautions: LBP at times, R ankle sprain on 18    Daily Treatment Diary     Manual             STM to R ankle nv brief           R ankle PROM nv 5           R ankle mobilizations nv np           IASTM medial R ankle  5'                            Exercise Diary             Bike nv 5'           Ankle ABC nv brief           BAPS board (Fwd/bwd, CW/ CCW) nv L2  10 ea           Towel crunches nv 10           Arch lifts nv np           Ankel 4 way nv AROM 10 ea           Gastroc/soleus stretch nv np           Seated HR/TR  brief           AA windshield wipers  10                        clams nv np           Bridge with hip add nv np           Bridge with hip abd nv np             np           Tandem on foam nv np           SLS on floor nv np Tandem walking on beam nv np                                                                                Modalities  12/20            CP PRN np

## 2018-12-27 NOTE — PROGRESS NOTES
RABIA Farrar  Attending, Orthopaedic Surgery  [facility] [address] [address] [facility]      ORTHOPAEDIC FOOT AND ANKLE CLINIC VISIT     Assessment:     Encounter Diagnosis   Name Primary?  Sprain of deltoid ligament of right ankle, subsequent encounter Yes            Plan:   · The patient verbalized understanding of exam findings and treatment plan  We engaged in the shared decision-making process and treatment options were discussed at length with the patient  Surgical and conservative management discussed today along with risks and benefits  · Continue with physical therapy  · Continue with ASO brace with activity  · Continue with proper shoe gear  · Modify activity, no prolonged walking, no high impact exercises  · Work restrictions given  · Continue with RICE  Return in about 2 weeks (around 1/10/2019) for Recheck right ankle deltoid ligament sprain  [place] Side History of Present Illness:   Chief Complaint:   Chief Complaint   Patient presents with    Right Ankle - Follow-up     Maria Del Carmen Anderson is a 28 y o  male who is being seen in follow-up for Right deltoid ligament sprain  When we last saw he we recommended physical therapy, aso brace, light duty or no work til this visit  Pain has not improved  Residual pain is localized at medial ankle deltoid ligament, ptt with minimal radiating and described as sharp and severe  He has done one session of physical therapy, and has only had the ASO brace x 1 week  Pain/symptom timing:  Worse during the day when active  Pain/symptom context:  Worse with activites and work  Pain/symptom modifying factors:  Rest makes better, activities make worse  Pain/symptom associated signs/symptoms: none    Prior treatment   · NSAIDsYes   · Injections No   · Bracing/Orthotics Yes    · Physical Therapy Yes one session    Orthopedic Surgical History:   See previous note      Past Medical, Surgical and Social History:  Past Medical History: has a past medical history of Acid reflux disease and Gout  Problem List:  does not have any pertinent problems on file  Past Surgical History:  has a past surgical history that includes [place] tooth extraction  Family History: family history includes Breast cancer in his mother; Diabetes in his father and sister; Migraines in his sister; Other in his mother  Social History:  reports that he has been smoking  He has been smoking about 0 25 packs per day  He has never used smokeless tobacco  He reports that he does not drink alcohol or use drugs  Current Medications: has a current medication list which includes the following prescription(s): buprenorphine and omeprazole  Allergies: has No Known Allergies  Review of Systems:  General- denies fever/chills  HEENT- denies hearing loss or sore throat  Eyes- denies eye pain or visual disturbances, denies red eyes  Respiratory- denies cough or SOB  Cardio- denies chest pain or palpitations  GI- denies abdominal pain  Endocrine- denies urinary frequency  Urinary- denies pain with urination  Musculoskeletal- Negative except noted above  Skin- denies rashes or wounds  Neurological- denies dizziness or headache  Psychiatric- denies anxiety or difficulty concentrating    Physical Exam:   /94 (BP Location: Right arm, Patient Position: Sitting, Cuff Size: Adult)   Pulse 103   Ht 6' 3" (1 905 m)   Wt 132 kg (290 lb)   BMI 36 25 kg/m²   General/Constitutional: No apparent distress: well-nourished and well developed  Eyes: normal ocular motion  Lymphatic: No appreciable lymphadenopathy  Respiratory: Non-labored breathing  Vascular: No edema, swelling or tenderness, except as noted in detailed exam   Integumentary: No impressive skin lesions present, except as noted in detailed exam   Neuro: No ataxia or tremors noted  Psych: Normal mood and affect, oriented to person, place and time  Appropriate affect    Musculoskeletal: Normal, except as noted in detailed exam and in HPI  Examination    Right    Gait Antalgic   Musculoskeletal Tender to palpation at deltoid ligament    Skin Mild swelling present of right ankle    Nails Normal    Range of Motion  20 degrees dorsiflexion, 50 degrees plantarflexion  Subtalar motion: normal    Stability Stable    Muscle Strength 5/5 tibialis anterior  5/5 gastrocnemius-soleus  4/5 posterior tibialis  5/5 peroneal/eversion strength  5/5 EHL  5/5 FHL    Neurologic Normal    Sensation Intact to light touch throughout sural, saphenous, superficial peroneal, deep peroneal and medial/lateral plantar nerve distributions  [place]-Maya 5 07 filament (10g) testing deferred  Cardiovascular Brisk capillary refill < 2 seconds,intact DP and PT pulses    Special Tests Anterior drawer :  negative      Imaging Studies:   No new imaging    Scribe Attestation    I,:   Que Steve am acting as a scribe while in the presence of the attending physician :        I,:   Marga Denson MD personally performed the services described in this documentation    as scribed in my presence :                Noelia Clack Lachman, MD  Foot & Ankle Surgery   Department of [address] [place] [address]      I personally performed the service  Noelia Clack Lachman, MD

## 2018-12-27 NOTE — LETTER
December 27, 2018     Patient: Benedict Robles   YOB: 1985   Date of Visit: 12/27/2018       To Whom it May Concern:    Benedict Robles is under my professional care  He was seen in my office on 12/27/2018  He is to remain on light duty, if no light duty is available he is to remain out of work until next visit  If you have any questions or concerns, please don't hesitate to call           Sincerely,          Doyle Sharma MD        CC: Benedict Robles

## 2018-12-28 ENCOUNTER — TELEPHONE (OUTPATIENT)
Dept: OBGYN CLINIC | Facility: HOSPITAL | Age: 33
End: 2018-12-28

## 2018-12-28 NOTE — TELEPHONE ENCOUNTER
Called patient regarding voicemail that was left for disability/ fmla forms to be completed  Patient claimed he had no information about what I was talking about or what disability was  [place] it might have been someone else on his behalf but that he never called us about his work status or forms that needed to be completed

## 2019-01-10 ENCOUNTER — OFFICE VISIT (OUTPATIENT)
Dept: OBGYN CLINIC | Facility: CLINIC | Age: 34
End: 2019-01-10
Payer: OTHER MISCELLANEOUS

## 2019-01-10 VITALS
WEIGHT: 290 LBS | HEART RATE: 85 BPM | BODY MASS INDEX: 36.06 KG/M2 | SYSTOLIC BLOOD PRESSURE: 152 MMHG | HEIGHT: 75 IN | DIASTOLIC BLOOD PRESSURE: 89 MMHG

## 2019-01-10 DIAGNOSIS — S93.421D SPRAIN OF DELTOID LIGAMENT OF RIGHT ANKLE, SUBSEQUENT ENCOUNTER: Primary | ICD-10-CM

## 2019-01-10 PROCEDURE — 99213 OFFICE O/P EST LOW 20 MIN: CPT | Performed by: ORTHOPAEDIC SURGERY

## 2019-01-10 NOTE — LETTER
January 10, 2019     Patient: Camila Mays   YOB: 1985   Date of Visit: 1/10/2019       To Whom it May Concern:    Camila Mays is under my professional care  He was seen in my office on 1/10/2019  He is cleared to return to work 1/14/19 without restrictions  If you have any questions or concerns, please don't hesitate to call           Sincerely,          Francisco Douglass MD        CC: Camila Grierchely

## 2019-01-10 NOTE — PROGRESS NOTES
Davis Carrel, M D  Attending, Orthopaedic Surgery  [facility] [address] [facility]      ORTHOPAEDIC FOOT AND ANKLE CLINIC VISIT     Assessment:     Encounter Diagnosis   Name Primary?  Sprain of deltoid ligament of right ankle, subsequent encounter Yes            Plan:   · The patient verbalized understanding of exam findings and treatment plan  We engaged in the shared decision-making process and treatment options were discussed at length with the patient  Surgical and conservative management discussed today along with risks and benefits  · He has made a complete recovery which is great  He is cleared to return to work without restrictions 1/14/19  Follow-up PRN        History of Present Illness:   Chief Complaint: Right foot pain  Lalito Henderson is a 27 y o  male who is being seen in follow-up for Right ankle sprain deltoid ligament  When we last saw he we recommended continue physical therapy, continue with ASO brace with activity, continue with RICE and modified activity  Pain has significantly improved  Residual pain is localized at medial ankle but is mild  Pain/symptom timing:  Worse during the day when active  Pain/symptom context:  Worse with activites and work  Pain/symptom modifying factors:  Rest makes better, activities make worse  Pain/symptom associated signs/symptoms: none    Prior treatment   · NSAIDsYes   · Injections No   · Bracing/Orthotics Yes    · Physical Therapy Yes     Orthopedic Surgical History:   See previous note  Past Medical, Surgical and Social History:  Past Medical History:  has a past medical history of Acid reflux disease and Gout  Problem List:  does not have any pertinent problems on file  Past Surgical History:  has a past surgical history that includes [place] tooth extraction  Family History: family history includes Breast cancer in his mother; Diabetes in his father and sister; Migraines in his sister; Other in his mother    Social History:  reports that he has been smoking  He has been smoking about 0 25 packs per day  He has never used smokeless tobacco  He reports that he does not drink alcohol or use drugs  Current Medications: has a current medication list which includes the following prescription(s): buprenorphine and omeprazole  Allergies: has No Known Allergies  Review of Systems:  General- denies fever/chills  HEENT- denies hearing loss or sore throat  Eyes- denies eye pain or visual disturbances, denies red eyes  Respiratory- denies cough or SOB  Cardio- denies chest pain or palpitations  GI- denies abdominal pain  Endocrine- denies urinary frequency  Urinary- denies pain with urination  Musculoskeletal- Negative except noted above  Skin- denies rashes or wounds  Neurological- denies dizziness or headache  Psychiatric- denies anxiety or difficulty concentrating    Physical Exam:   There were no vitals taken for this visit  General/Constitutional: No apparent distress: well-nourished and well developed  Eyes: normal ocular motion  Lymphatic: No appreciable lymphadenopathy  Respiratory: Non-labored breathing  Vascular: No edema, swelling or tenderness, except as noted in detailed exam   Integumentary: No impressive skin lesions present, except as noted in detailed exam   Neuro: No ataxia or tremors noted  Psych: Normal mood and affect, oriented to person, place and time  Appropriate affect  Musculoskeletal: Normal, except as noted in detailed exam and in HPI      Examination    Right    Gait Normal   Musculoskeletal Tender to palpation at deltoid    Skin Normal       Nails Normal    Range of Motion  20 degrees dorsiflexion, 30 degrees plantarflexion  Subtalar motion: normal    Stability Stable    Muscle Strength 5/5 tibialis anterior  5/5 gastrocnemius-soleus  5/5 posterior tibialis  5/5 peroneal/eversion strength  5/5 EHL  5/5 FHL    Neurologic Normal    Sensation Intact to light touch throughout sural, saphenous, superficial peroneal, deep peroneal and medial/lateral plantar nerve distributions  [place]-Maya 5 07 filament (10g) testing deferred  Cardiovascular Brisk capillary refill < 2 seconds,intact DP and PT pulses    Special Tests None      Imaging Studies:   No new imaging    Scribe Attestation    I,:    am acting as a scribe while in the presence of the attending physician :        I,:    personally performed the services described in this documentation    as scribed in my presence :                Earlyne Ohms Lachman, MD  Foot & Ankle Surgery   Department of [address] [place] [address]      I personally performed the service  Earlyne Ohms Lachman, MD

## 2019-02-09 ENCOUNTER — OFFICE VISIT (OUTPATIENT)
Dept: FAMILY MEDICINE CLINIC | Facility: HOSPITAL | Age: 34
End: 2019-02-09
Payer: COMMERCIAL

## 2019-02-09 VITALS
BODY MASS INDEX: 37.72 KG/M2 | DIASTOLIC BLOOD PRESSURE: 86 MMHG | WEIGHT: 303.4 LBS | TEMPERATURE: 97 F | SYSTOLIC BLOOD PRESSURE: 142 MMHG | HEART RATE: 73 BPM | HEIGHT: 75 IN | OXYGEN SATURATION: 97 %

## 2019-02-09 DIAGNOSIS — Z00.00 ANNUAL PHYSICAL EXAM: Primary | ICD-10-CM

## 2019-02-09 DIAGNOSIS — Z13.228 SCREENING FOR ENDOCRINE, METABOLIC AND IMMUNITY DISORDER: ICD-10-CM

## 2019-02-09 DIAGNOSIS — Z13.1 SCREENING FOR DIABETES MELLITUS: ICD-10-CM

## 2019-02-09 DIAGNOSIS — F17.200 TOBACCO DEPENDENCE: ICD-10-CM

## 2019-02-09 DIAGNOSIS — Z13.29 SCREENING FOR ENDOCRINE, METABOLIC AND IMMUNITY DISORDER: ICD-10-CM

## 2019-02-09 DIAGNOSIS — Z13.0 SCREENING FOR ENDOCRINE, METABOLIC AND IMMUNITY DISORDER: ICD-10-CM

## 2019-02-09 DIAGNOSIS — Z13.6 SCREENING FOR CARDIOVASCULAR CONDITION: ICD-10-CM

## 2019-02-09 DIAGNOSIS — R81 GLUCOSURIA: ICD-10-CM

## 2019-02-09 PROCEDURE — 99395 PREV VISIT EST AGE 18-39: CPT | Performed by: NURSE PRACTITIONER

## 2019-02-09 RX ORDER — IBUPROFEN 200 MG
TABLET ORAL EVERY 6 HOURS PRN
COMMUNITY

## 2019-02-09 NOTE — PROGRESS NOTES
[facility]enna [facility]ell Physician Thom Alex MD    NAME: Zonia Curiel  AGE: 27 y o  SEX: male  : 1985     DATE: 2019     Assessment and Plan:     Problem List Items Addressed This Visit     None      Visit Diagnoses     Annual physical exam    -  Primary    Screening for diabetes mellitus        Relevant Orders    Hemoglobin A1C    Screening for cardiovascular condition        Relevant Orders    Lipid panel    Tobacco dependence        Glucosuria        Screening for endocrine, metabolic and immunity disorder        Relevant Orders    Comprehensive metabolic panel    CBC and differential    TSH, 3rd generation with Free T4 reflex          Health maintenance and preventative care screenings were discussed with patient today  Appropriate education was printed on patient's after visit summary  · Discussed risks/benefits of screening for high cholesterol and diabetes  Patient agrees to screening for high cholesterol and diabetes  · Immunizations were reviewed: patient declines influenza vaccine  Counseling:  Dental Health: discussed importance of regular tooth brushing, flossing, and dental visits  Injury prevention: discussed safety/seat belts, safety helmets, smoke detectors, carbon dioxide detectors, and smoking near bedding or upholstery  BMI Counseling: Body mass index is 38 43 kg/m²  Discussed the patient's BMI with him  The BMI is above average  BMI counseling and education was provided to the patient  Nutrition recommendations include reducing portion sizes, decreasing overall calorie intake, 3-5 servings of fruits/vegetables daily, consuming healthier snacks, decreasing soda and/or juice intake, moderation in carbohydrate intake and increasing intake of lean protein  Exercise recommendations include moderate aerobic physical activity for 150 minutes/week  Tobacco Cessation Counseling: Tobacco cessation counseling and education was provided   The patient is sincerely urged to quit consumption of tobacco  He is not ready to quit tobacco  The numerous health risks of tobacco consumption were discussed  If he decides to quit, there are  anumber of helpful adjunctive aids, and he can see me to discuss nicotine replacement therapy, chantix, or bupropion anytime in the future  Sexual health: discussed sexually transmitted diseases, partner selection, use of condoms, avoidance of unintended pregnancy, and contraceptive alternatives  · Alcohol/drug use: discussed moderation in alcohol intake and avoidance of illicit drug use  F/U will be determined by lab results  No Follow-up on file  Chief Complaint:     Chief Complaint   Patient presents with    Annual Exam      History of Present Illness:     Adult Annual Physical   Patient here for a comprehensive physical exam  The patient reports 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 in urine  Strong family h/o DM  Family h/o heart disease  BP was notmal at [employer]  Checks BP at home 130s  Smoker  Has been reducing  Not ready to quit  No formal exercise but very active at work  Works outdoors  Use of energy drinks  1 cup coffee/day  Eats processed meats  Minimal fast food  Drinks sugary drinks  Diet and Physical Activity  · Diet/Nutrition: poor diet  · Weight concerns: patient has class 2 obesity (BMI 35 0-39 9)  · Exercise: no formal exercise  Depression Screening  PHQ-9 Depression Screening    PHQ-9:    Frequency of the following problems over the past two weeks:            General Health  · Sleep: gets 7-8 hours of sleep on average  · Hearing: normal - bilateral   · Vision: goes for regular eye exams, most recent eye exam <1 year ago and wears contacts  · Dental: regular dental visits   Health  · History of STDs?: no   · Erectile dysfunction: no      Review of Systems:     Review of Systems   Constitutional: Negative    Negative for activity change, appetite change, fatigue, fever and unexpected weight change  HENT: Negative for congestion, ear pain, sinus pain, sinus pressure, sore throat, trouble swallowing and voice change  Eyes: Negative  Negative for visual disturbance  Respiratory: Negative for cough, chest tightness, shortness of breath and wheezing  Cardiovascular: Negative for chest pain, palpitations and leg swelling  Gastrointestinal: Negative for abdominal pain, blood in stool, constipation, diarrhea, nausea, rectal pain and vomiting  Endocrine: Negative  Genitourinary: Negative for decreased urine volume, difficulty urinating, dysuria, frequency and urgency  Musculoskeletal: Negative for arthralgias and myalgias  Skin: Negative for rash and wound  Neurological: Negative for dizziness, weakness, light-headedness, numbness and headaches  Hematological: Negative  Psychiatric/Behavioral: Negative  Negative for sleep disturbance  The patient is not nervous/anxious         Past Medical History:     Past Medical History:   Diagnosis Date    Acid reflux disease     Gout       Past Surgical History:     Past Surgical History:   Procedure Laterality Date    WISDOM TOOTH EXTRACTION        Social History:     Social History     Social History    Marital status: /Civil Union     Spouse name: N/A    Number of children: N/A    Years of education: N/A     Social History Main Topics    Smoking status: Current Every Day Smoker     Packs/day: 0 25    Smokeless tobacco: Never Used      Comment: currnet some days smoker per allscripts     Alcohol use No    Drug use: No      Comment: hx of prescription drug abuse     Sexual activity: Not Asked     Other Topics Concern    None     Social History Narrative    None      Family History:     Family History   Problem Relation Age of Onset   [employer] Breast cancer Mother     Other Mother         degenerative disc disease     Diabetes Father     Diabetes Sister     Migraines Sister     Heart disease Maternal Aunt Current Medications:     Current Outpatient Prescriptions   Medication Sig Dispense Refill    buprenorphine (SUBUTEX) 8 mg 3 (three) times a day        ibuprofen (MOTRIN) 200 mg tablet Take by mouth every 6 (six) hours as needed for mild pain      omeprazole (PriLOSEC) 40 MG capsule Take 1 capsule by mouth       No current facility-administered medications for this visit  Allergies:     No Known Allergies   Objective:     /86 (Patient Position: Sitting, Cuff Size: Large)   Pulse 73   Temp (!) 97 °F (36 1 °C) (Tympanic)   Ht 6' 2 5" (1 892 m)   Wt (!) 138 kg (303 lb 6 4 oz)   SpO2 97%   BMI 38 43 kg/m²     Physical Exam   Constitutional: He is oriented to person, place, and time  He appears well-developed and well-nourished  HENT:   Head: Normocephalic  Right Ear: External ear normal    Left Ear: External ear normal    Nose: Nose normal    Mouth/Throat: Oropharynx is clear and moist    Eyes: Pupils are equal, round, and reactive to light  Conjunctivae are normal    Neck: Normal range of motion  Neck supple  No thyromegaly present  Cardiovascular: Normal rate, regular rhythm and normal heart sounds  No murmur heard  Pulmonary/Chest: Effort normal and breath sounds normal    Abdominal: Soft  Bowel sounds are normal  There is no hepatosplenomegaly  There is no tenderness  Obese abdomen   Musculoskeletal: Normal range of motion  Lymphadenopathy:     He has no cervical adenopathy  Neurological: He is alert and oriented to person, place, and time  Skin: Skin is warm and dry  Psychiatric: He has a normal mood and affect  His behavior is normal  Judgment and thought content normal    Vitals reviewed         Health Maintenance:     Health Maintenance   Topic Date Due    Pneumococcal PPSV23 Medium Risk Adult (1 of 1 - PPSV23) 01/03/2003    INFLUENZA VACCINE  07/01/2018    PT PLAN OF CARE  01/19/2019    Depression Screening PHQ  12/20/2019    DTaP,Tdap,and Td Vaccines (2 - Td) 05/11/2026     Immunization History   Administered Date(s) Administered    Tdap 05/11/2016       Diann Donohue MD

## 2019-02-09 NOTE — PATIENT INSTRUCTIONS
Wellness Visit for Adults   AMBULATORY CARE:   A wellness visit  is when you see your healthcare provider to get screened for health problems  You can also get advice on how to stay healthy  Write down your questions so you remember to ask them  Ask your healthcare provider how often you should have a wellness visit  What happens at a wellness visit:  Your healthcare provider will ask about your health, and your family history of health problems  This includes high blood pressure, heart disease, and cancer  He or she will ask if you have symptoms that concern you, if you smoke, and about your mood  You may also be asked about your intake of medicines, supplements, food, and alcohol  Any of the following may be done:  · Your weight  will be checked  Your height may also be checked so your body mass index (BMI) can be calculated  Your BMI shows if you are at a healthy weight  · Your blood pressure  and heart rate will be checked  Your temperature may also be checked  · Blood and urine tests  may be done  Blood tests may be done to check your cholesterol levels  Abnormal cholesterol levels increase your risk for heart disease and stroke  You may also need a blood or urine test to check for diabetes if you are at increased risk  Urine tests may be done to look for signs of an infection or kidney disease  · A physical exam  includes checking your heartbeat and lungs with a stethoscope  Your healthcare provider may also check your skin to look for sun damage  · Screening tests  may be recommended  A screening test is done to check for diseases that may not cause symptoms  The screening tests you may need depend on your age, gender, family history, and lifestyle habits  For example, colorectal screening may be recommended if you are 48years old or older  Screening tests you need if you are a woman:   · A Pap smear  is used to screen for cervical cancer   Pap smears are usually done every 3 to 5 years depending on your age  You may need them more often if you have had abnormal Pap smear test results in the past  Ask your healthcare provider how often you should have a Pap smear  · A mammogram  is an x-ray of your breasts to screen for breast cancer  Experts recommend mammograms every 2 years starting at age 48 years  You may need a mammogram at age 52 years or younger if you have an increased risk for breast cancer  Talk to your healthcare provider about when you should start having mammograms and how often you need them  Vaccines you may need:   · Get an influenza vaccine  every year  The influenza vaccine protects you from the flu  Several types of viruses cause the flu  The viruses change over time, so new vaccines are made each year  · Get a tetanus-diphtheria (Td) booster vaccine  every 10 years  This vaccine protects you against tetanus and diphtheria  Tetanus is a severe infection that may cause painful muscle spasms and lockjaw  Diphtheria is a severe bacterial infection that causes a thick covering in the back of your mouth and throat  · Get a human papillomavirus (HPV) vaccine  if you are female and aged 23 to 32 or male 23 to 24 and never received it  This vaccine protects you from HPV infection  HPV is the most common infection spread by sexual contact  HPV may also cause vaginal, penile, and anal cancers  · Get a pneumococcal vaccine  if you are aged 72 years or older  The pneumococcal vaccine is an injection given to protect you from pneumococcal disease  Pneumococcal disease is an infection caused by pneumococcal bacteria  The infection may cause pneumonia, meningitis, or an ear infection  · Get a shingles vaccine  if you are aged 61 or older, even if you have had shingles before  The shingles vaccine is an injection to protect you from the varicella-zoster virus  This is the same virus that causes chickenpox   Shingles is a painful rash that develops in people who had chickenpox or have been exposed to the virus  How to eat healthy:  My Plate is a model for planning healthy meals  It shows the types and amounts of foods that should go on your plate  Fruits and vegetables make up about half of your plate, and grains and protein make up the other half  A serving of dairy is included on the side of your plate  The amount of calories and serving sizes you need depends on your age, gender, weight, and height  Examples of healthy foods are listed below:  · Eat a variety of vegetables  such as dark green, red, and orange vegetables  You can also include canned vegetables low in sodium (salt) and frozen vegetables without added butter or sauces  · Eat a variety of fresh fruits , canned fruit in 100% juice, frozen fruit, and dried fruit  · Include whole grains  At least half of the grains you eat should be whole grains  Examples include whole-wheat bread, wheat pasta, brown rice, and whole-grain cereals such as oatmeal     · Eat a variety of protein foods such as seafood (fish and shellfish), lean meat, and poultry without skin (turkey and chicken)  Examples of lean meats include pork leg, shoulder, or tenderloin, and beef round, sirloin, tenderloin, and extra lean ground beef  Other protein foods include eggs and egg substitutes, beans, peas, soy products, nuts, and seeds  · Choose low-fat dairy products such as skim or 1% milk or low-fat yogurt, cheese, and cottage cheese  · Limit unhealthy fats  such as butter, hard margarine, and shortening  Exercise:  Exercise at least 30 minutes per day on most days of the week  Some examples of exercise include walking, biking, dancing, and swimming  You can also fit in more physical activity by taking the stairs instead of the elevator or parking farther away from stores  Include muscle strengthening activities 2 days each week  Regular exercise provides many health benefits   It helps you manage your weight, and decreases your risk for type 2 diabetes, heart disease, stroke, and high blood pressure  Exercise can also help improve your mood  Ask your healthcare provider about the best exercise plan for you  General health and safety guidelines:   · Do not smoke  Nicotine and other chemicals in cigarettes and cigars can cause lung damage  Ask your healthcare provider for information if you currently smoke and need help to quit  E-cigarettes or smokeless tobacco still contain nicotine  Talk to your healthcare provider before you use these products  · Limit alcohol  A drink of alcohol is 12 ounces of beer, 5 ounces of wine, or 1½ ounces of liquor  · Lose weight, if needed  Being overweight increases your risk of certain health conditions  These include heart disease, high blood pressure, type 2 diabetes, and certain types of cancer  · Protect your skin  Do not sunbathe or use tanning beds  Use sunscreen with a SPF 15 or higher  Apply sunscreen at least 15 minutes before you go outside  Reapply sunscreen every 2 hours  Wear protective clothing, hats, and sunglasses when you are outside  · Drive safely  Always wear your seatbelt  Make sure everyone in your car wears a seatbelt  A seatbelt can save your life if you are in an accident  Do not use your cell phone when you are driving  This could distract you and cause an accident  Pull over if you need to make a call or send a text message  · Practice safe sex  Use latex condoms if are sexually active and have more than one partner  Your healthcare provider may recommend screening tests for sexually transmitted infections (STIs)  · Wear helmets, lifejackets, and protective gear  Always wear a helmet when you ride a bike or motorcycle, go skiing, or play sports that could cause a head injury  Wear protective equipment when you play sports  Wear a lifejacket when you are on a boat or doing water sports    © 2017 [address] [employer] Information is for End User's use only and may not be sold, redistributed or otherwise used for commercial purposes  All illustrations and images included in CareNotes® are the copyrighted property of [employer]  or [employer]  The above information is an  only  It is not intended as medical advice for individual conditions or treatments  Talk to your doctor, nurse or pharmacist before following any medical regimen to see if it is safe and effective for you  Obesity   AMBULATORY CARE:   Obesity  is when your body mass index (BMI) is greater than 30  Your healthcare provider will use your height and weight to measure your BMI  The risks of obesity include  many health problems, such as injuries or physical disability  You may need tests to check for the following:  · Diabetes     · High blood pressure or high cholesterol     · Heart disease     · Gallbladder or liver disease     · Cancer of the colon, breast, prostate, liver, or kidney     · Sleep apnea     · Arthritis or gout  Seek care immediately if:   · You have a severe headache, confusion, or difficulty speaking  · You have weakness on one side of your body  · You have chest pain, sweating, or shortness of breath  Contact your healthcare provider if:   · You have symptoms of gallbladder or liver disease, such as pain in your upper abdomen  · You have knee or hip pain and discomfort while walking  · You have symptoms of diabetes, such as intense hunger and thirst, and frequent urination  · You have symptoms of sleep apnea, such as snoring or daytime sleepiness  · You have questions or concerns about your condition or care  Treatment for obesity  focuses on helping you lose weight to improve your health  Even a small decrease in BMI can reduce the risk for many health problems  Your healthcare provider will help you set a weight-loss goal   · Lifestyle changes  are the first step in treating obesity   These include making healthy food choices and getting regular physical activity  Your healthcare provider may suggest a weight-loss program that involves coaching, education, and therapy  · Medicine  may help you lose weight when it is used with a healthy diet and physical activity  · Surgery  can help you lose weight if you are very obese and have other health problems  There are several types of weight-loss surgery  Ask your healthcare provider for more information  Be successful losing weight:   · Set small, realistic goals  An example of a small goal is to walk for 20 minutes 5 days a week  Anther goal is to lose 5% of your body weight  · Tell friends, family members, and coworkers about your goals  and ask for their support  Ask a friend to lose weight with you, or join a weight-loss support group  · Identify foods or triggers that may cause you to overeat , and find ways to avoid them  Remove tempting high-calorie foods from your home and workplace  Place a bowl of fresh fruit on your kitchen counter  If stress causes you to eat, then find other ways to cope with stress  · Keep a diary to track what you eat and drink  Also write down how many minutes of physical activity you do each day  Weigh yourself once a week and record it in your diary  Eating changes: You will need to eat 500 to 1,000 fewer calories each day than you currently eat to lose 1 to 2 pounds a week  The following changes will help you cut calories:  · Eat smaller portions  Use small plates, no larger than 9 inches in diameter  Fill your plate half full of fruits and vegetables  Measure your food using measuring cups until you know what a serving size looks like  · Eat 3 meals and 1 or 2 snacks each day  Plan your meals in advance  [employer] and eat at home most of the time  Eat slowly  · Eat fruits and vegetables at every meal   They are low in calories and high in fiber, which makes you feel full   Do not add butter, margarine, or cream sauce to vegetables  Use herbs to season steamed vegetables  · Eat less fat and fewer fried foods  Eat more baked or grilled chicken and fish  These protein sources are lower in calories and fat than red meat  Limit fast food  Dress your salads with olive oil and vinegar instead of bottled dressing  · Limit the amount of sugar you eat  Do not drink sugary beverages  Limit alcohol  Activity changes:  Physical activity is good for your body in many ways  It helps you burn calories and build strong muscles  It decreases stress and depression, and improves your mood  It can also help you sleep better  Talk to your healthcare provider before you begin an exercise program   · Exercise for at least 30 minutes 5 days a week  Start slowly  Set aside time each day for physical activity that you enjoy and that is convenient for you  It is best to do both weight training and an activity that increases your heart rate, such as walking, bicycling, or swimming  · Find ways to be more active  Do yard work and housecleaning  Walk up the stairs instead of using elevators  Spend your leisure time going to events that require walking, such as outdoor festivals or fairs  This extra physical activity can help you lose weight and keep it off  Follow up with your healthcare provider as directed: You may need to meet with a dietitian  Write down your questions so you remember to ask them during your visits  © 2017 [address] [employer] Information is for End User's use only and may not be sold, redistributed or otherwise used for commercial purposes  All illustrations and images included in CareNotes® are the copyrighted property of [employer]  or [employer]  The above information is an  only  It is not intended as medical advice for individual conditions or treatments   Talk to your doctor, nurse or pharmacist before following any medical regimen to see if it is safe and effective for you

## 2019-02-12 LAB
ALBUMIN SERPL-MCNC: 4.2 G/DL (ref 3.5–5.5)
ALBUMIN/GLOB SERPL: 1.8 {RATIO} (ref 1.2–2.2)
ALP SERPL-CCNC: 108 IU/L (ref 39–117)
ALT SERPL-CCNC: 65 IU/L (ref 0–44)
AST SERPL-CCNC: 31 IU/L (ref 0–40)
BASOPHILS # BLD AUTO: 0.1 X10E3/UL (ref 0–0.2)
BASOPHILS NFR BLD AUTO: 1 %
BILIRUB SERPL-MCNC: 0.3 MG/DL (ref 0–1.2)
BUN SERPL-MCNC: 8 MG/DL (ref 6–20)
BUN/CREAT SERPL: 12 (ref 9–20)
CALCIUM SERPL-MCNC: 8.8 MG/DL (ref 8.7–10.2)
CHLORIDE SERPL-SCNC: 100 MMOL/L (ref 96–106)
CHOLEST SERPL-MCNC: 170 MG/DL (ref 100–199)
CHOLEST/HDLC SERPL: 6.3 RATIO (ref 0–5)
CO2 SERPL-SCNC: 22 MMOL/L (ref 20–29)
CREAT SERPL-MCNC: 0.69 MG/DL (ref 0.76–1.27)
EOSINOPHIL # BLD AUTO: 0.2 X10E3/UL (ref 0–0.4)
EOSINOPHIL NFR BLD AUTO: 3 %
ERYTHROCYTE [DISTWIDTH] IN BLOOD BY AUTOMATED COUNT: 14.7 % (ref 12.3–15.4)
EST. AVERAGE GLUCOSE BLD GHB EST-MCNC: 266 MG/DL
GLOBULIN SER-MCNC: 2.4 G/DL (ref 1.5–4.5)
GLUCOSE SERPL-MCNC: 264 MG/DL (ref 65–99)
HBA1C MFR BLD: 10.9 % (ref 4.8–5.6)
HCT VFR BLD AUTO: 42.6 % (ref 37.5–51)
HDLC SERPL-MCNC: 27 MG/DL
HGB BLD-MCNC: 14.4 G/DL (ref 13–17.7)
IMM GRANULOCYTES # BLD: 0 X10E3/UL (ref 0–0.1)
IMM GRANULOCYTES NFR BLD: 0 %
LDLC SERPL CALC-MCNC: 65 MG/DL (ref 0–99)
LDLC SERPL DIRECT ASSAY-MCNC: 86 MG/DL (ref 0–99)
LYMPHOCYTES # BLD AUTO: 2.1 X10E3/UL (ref 0.7–3.1)
LYMPHOCYTES NFR BLD AUTO: 35 %
MCH RBC QN AUTO: 28.7 PG (ref 26.6–33)
MCHC RBC AUTO-ENTMCNC: 33.8 G/DL (ref 31.5–35.7)
MCV RBC AUTO: 85 FL (ref 79–97)
MONOCYTES # BLD AUTO: 0.4 X10E3/UL (ref 0.1–0.9)
MONOCYTES NFR BLD AUTO: 6 %
NEUTROPHILS # BLD AUTO: 3.4 X10E3/UL (ref 1.4–7)
NEUTROPHILS NFR BLD AUTO: 55 %
PLATELET # BLD AUTO: 218 X10E3/UL (ref 150–379)
POTASSIUM SERPL-SCNC: 4.2 MMOL/L (ref 3.5–5.2)
PROT SERPL-MCNC: 6.6 G/DL (ref 6–8.5)
RBC # BLD AUTO: 5.02 X10E6/UL (ref 4.14–5.8)
SL AMB EGFR AFRICAN AMERICAN: 142 ML/MIN/1.73
SL AMB EGFR NON AFRICAN AMERICAN: 123 ML/MIN/1.73
SL AMB VLDL CHOLESTEROL CALC: 78 MG/DL (ref 5–40)
SODIUM SERPL-SCNC: 139 MMOL/L (ref 134–144)
TRIGL SERPL-MCNC: 391 MG/DL (ref 0–149)
TSH SERPL DL<=0.005 MIU/L-ACNC: 1.69 UIU/ML (ref 0.45–4.5)
WBC # BLD AUTO: 6.1 X10E3/UL (ref 3.4–10.8)

## 2019-02-12 PROCEDURE — 3046F HEMOGLOBIN A1C LEVEL >9.0%: CPT | Performed by: NURSE PRACTITIONER

## 2019-02-26 ENCOUNTER — OFFICE VISIT (OUTPATIENT)
Dept: FAMILY MEDICINE CLINIC | Facility: HOSPITAL | Age: 34
End: 2019-02-26
Payer: COMMERCIAL

## 2019-02-26 VITALS
SYSTOLIC BLOOD PRESSURE: 144 MMHG | TEMPERATURE: 97.8 F | HEART RATE: 80 BPM | WEIGHT: 295.8 LBS | DIASTOLIC BLOOD PRESSURE: 84 MMHG | HEIGHT: 75 IN | BODY MASS INDEX: 36.78 KG/M2

## 2019-02-26 DIAGNOSIS — E78.1 HYPERTRIGLYCERIDEMIA: ICD-10-CM

## 2019-02-26 DIAGNOSIS — K52.9 GASTROENTERITIS: ICD-10-CM

## 2019-02-26 DIAGNOSIS — E66.01 SEVERE OBESITY (BMI 35.0-39.9) WITH COMORBIDITY (HCC): ICD-10-CM

## 2019-02-26 DIAGNOSIS — I10 ESSENTIAL HYPERTENSION: ICD-10-CM

## 2019-02-26 DIAGNOSIS — E11.65 TYPE 2 DIABETES MELLITUS WITH HYPERGLYCEMIA, WITHOUT LONG-TERM CURRENT USE OF INSULIN (HCC): Primary | ICD-10-CM

## 2019-02-26 DIAGNOSIS — Z23 ENCOUNTER FOR IMMUNIZATION: ICD-10-CM

## 2019-02-26 PROBLEM — S93.421A SPRAIN OF DELTOID LIGAMENT OF RIGHT ANKLE: Status: RESOLVED | Noted: 2018-12-13 | Resolved: 2019-02-26

## 2019-02-26 PROCEDURE — 99214 OFFICE O/P EST MOD 30 MIN: CPT | Performed by: NURSE PRACTITIONER

## 2019-02-26 PROCEDURE — 4010F ACE/ARB THERAPY RXD/TAKEN: CPT | Performed by: NURSE PRACTITIONER

## 2019-02-26 PROCEDURE — 3008F BODY MASS INDEX DOCD: CPT | Performed by: NURSE PRACTITIONER

## 2019-02-26 PROCEDURE — 1036F TOBACCO NON-USER: CPT | Performed by: NURSE PRACTITIONER

## 2019-02-26 RX ORDER — LISINOPRIL 10 MG/1
10 TABLET ORAL DAILY
Qty: 30 TABLET | Refills: 3 | Status: SHIPPED | OUTPATIENT
Start: 2019-02-26

## 2019-02-26 NOTE — LETTER
February 26,2019    To Whom It May Concern:    Felix Quinn is currently under my professional care  He was recently diagnosed with Type 2 Diabetes after an incidental finding of glucosuria  He has john started on medication and will be followed closely every 3 months until his A1C is stable  Please feel free to contact me with any questions or concerns                 Kristine Cantu

## 2019-02-26 NOTE — PROGRESS NOTES
Assessment/Plan:    Gastroenteritis  [place] diet and advance as tolerated  Call with no improvement or worsening  Work note given  Type 2 diabetes mellitus with hyperglycemia, without long-term current use of insulin (Formerly [facility])  Lab Results   Component Value Date    HGBA1C 10 9 (H) 02/09/2019       New diagnosis  Long discussion with pt and wife about DM, long term effects and treatment options  Will start Metformin twice daily  OK to start once diarrhea has resolved  Start lisinopril  Referral to DM classes  Advised to have yearly diabetic eye exams  Will do foot exam at next OV>   Recheck labs in 3 months  F/U in 3 months  Essential hypertension  Start lisinopril  F/U in 3 months  Hypertriglyceridemia  Elevated TG with low HDL  LDL excellent  No need to start statin  No treatment as yet  Will likely improve with better glucose control  Recheck in 3 months  Diagnoses and all orders for this visit:    Type 2 diabetes mellitus with hyperglycemia, without long-term current use of insulin (Formerly [facility])  -     Hemoglobin A1C; Future  -     Glucose, fasting; Future  -     Microalbumin / creatinine urine ratio; Future  -     TSH, 3rd generation with Free T4 reflex; Future  -     lisinopril (ZESTRIL) 10 mg tablet; Take 1 tablet (10 mg total) by mouth daily  -     metFORMIN (GLUCOPHAGE) 500 mg tablet; Take 1 tablet (500 mg total) by mouth 2 (two) times a day with meals  -     Ambulatory referral to Diabetic Education; Future  -     Hemoglobin A1C  -     Glucose, fasting  -     Microalbumin / creatinine urine ratio  -     TSH, 3rd generation with Free T4 reflex    Hypertriglyceridemia  -     Lipid panel; Future  -     Lipid panel    Essential hypertension  -     lisinopril (ZESTRIL) 10 mg tablet; Take 1 tablet (10 mg total) by mouth daily    Gastroenteritis    Severe obesity (BMI 35 0-39  9) with comorbidity ([address] )          Subjective:      Patient ID: Zonia Curiel is a 27 y o  male     Here a few weeks 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  Labs were done and A1C found to be 10 9  Significant family h/o DM  Reports increased thirst  No polyphagia or polyuria  Denies any vision changes  Wife reports mood lability  Sweats a lot  The last 2 days has had nausea and diarrhea  Stool is becoming more formed  Some abdominal discomfort  No fever or chills  Just feels tired  Symptoms worse after eating  The following portions of the patient's history were reviewed and updated as appropriate: allergies, current medications, past family history, past medical history, past social history, past surgical history and problem list     Review of Systems   Constitutional: Positive for fatigue  Eyes: Negative for visual disturbance  Respiratory: Negative for shortness of breath  Cardiovascular: Negative for chest pain, palpitations and leg swelling  Gastrointestinal: Positive for abdominal pain, diarrhea and nausea  Negative for vomiting  Endocrine: Positive for polydipsia  Negative for polyphagia and polyuria  Neurological: Negative for dizziness, weakness and numbness  Psychiatric/Behavioral: Positive for agitation  Objective:  Vitals:    02/26/19 1753   BP: 144/84   Pulse: 80   Temp: 97 8 °F (36 6 °C)      Physical Exam   Constitutional: He is oriented to person, place, and time  He appears well-developed and well-nourished  Cardiovascular: Normal rate, regular rhythm and normal heart sounds  No murmur heard  Pulses:       Carotid pulses are 2+ on the right side, and 2+ on the left side  No carotid bruit B/L   Pulmonary/Chest: Effort normal and breath sounds normal    Neurological: He is alert and oriented to person, place, and time  Skin: Skin is warm and dry  Psychiatric: He has a normal mood and affect  His behavior is normal  Judgment and thought content normal        BMI Counseling: Body mass index is 37 47 kg/m²   Discussed the patient's BMI with him  The BMI is above average  BMI counseling and education was provided to the patient  Nutrition recommendations include reducing portion sizes, decreasing overall calorie intake, consuming healthier snacks, moderation in carbohydrate intake and increasing intake of lean protein  Exercise recommendations include moderate aerobic physical activity for 150 minutes/week

## 2019-02-26 NOTE — LETTER
February 26, 2019     Patient: Radha Padilla   YOB: 1985   Date of Visit: 2/26/2019       To Whom it May Concern:    Radha Padilla is under my professional care  He was seen in my office on 2/26/2019  He may return to work on 2/28/19  If you have any questions or concerns, please don't hesitate to call           Sincerely,          MUKESH Ly        CC: No Recipients

## 2019-02-26 NOTE — PATIENT INSTRUCTIONS
Obesity   AMBULATORY CARE:   Obesity  is when your body mass index (BMI) is greater than 30  Your healthcare provider will use your height and weight to measure your BMI  The risks of obesity include  many health problems, such as injuries or physical disability  You may need tests to check for the following:  · Diabetes     · High blood pressure or high cholesterol     · Heart disease     · Gallbladder or liver disease     · Cancer of the colon, breast, prostate, liver, or kidney     · Sleep apnea     · Arthritis or gout  Seek care immediately if:   · You have a severe headache, confusion, or difficulty speaking  · You have weakness on one side of your body  · You have chest pain, sweating, or shortness of breath  Contact your healthcare provider if:   · You have symptoms of gallbladder or liver disease, such as pain in your upper abdomen  · You have knee or hip pain and discomfort while walking  · You have symptoms of diabetes, such as intense hunger and thirst, and frequent urination  · You have symptoms of sleep apnea, such as snoring or daytime sleepiness  · You have questions or concerns about your condition or care  Treatment for obesity  focuses on helping you lose weight to improve your health  Even a small decrease in BMI can reduce the risk for many health problems  Your healthcare provider will help you set a weight-loss goal   · Lifestyle changes  are the first step in treating obesity  These include making healthy food choices and getting regular physical activity  Your healthcare provider may suggest a weight-loss program that involves coaching, education, and therapy  · Medicine  may help you lose weight when it is used with a healthy diet and physical activity  · Surgery  can help you lose weight if you are very obese and have other health problems  There are several types of weight-loss surgery  Ask your healthcare provider for more information    Be successful losing weight:   · Set small, realistic goals  An example of a small goal is to walk for 20 minutes 5 days a week  Anther goal is to lose 5% of your body weight  · Tell friends, family members, and coworkers about your goals  and ask for their support  Ask a friend to lose weight with you, or join a weight-loss support group  · Identify foods or triggers that may cause you to overeat , and find ways to avoid them  Remove tempting high-calorie foods from your home and workplace  Place a bowl of fresh fruit on your kitchen counter  If stress causes you to eat, then find other ways to cope with stress  · Keep a diary to track what you eat and drink  Also write down how many minutes of physical activity you do each day  Weigh yourself once a week and record it in your diary  Eating changes: You will need to eat 500 to 1,000 fewer calories each day than you currently eat to lose 1 to 2 pounds a week  The following changes will help you cut calories:  · Eat smaller portions  Use small plates, no larger than 9 inches in diameter  Fill your plate half full of fruits and vegetables  Measure your food using measuring cups until you know what a serving size looks like  · Eat 3 meals and 1 or 2 snacks each day  Plan your meals in advance  Coby Laughter and eat at home most of the time  Eat slowly  · Eat fruits and vegetables at every meal   They are low in calories and high in fiber, which makes you feel full  Do not add butter, margarine, or cream sauce to vegetables  Use herbs to season steamed vegetables  · Eat less fat and fewer fried foods  Eat more baked or grilled chicken and fish  These protein sources are lower in calories and fat than red meat  Limit fast food  Dress your salads with olive oil and vinegar instead of bottled dressing  · Limit the amount of sugar you eat  Do not drink sugary beverages  Limit alcohol  Activity changes:  Physical activity is good for your body in many ways   It helps you burn calories and build strong muscles  It decreases stress and depression, and improves your mood  It can also help you sleep better  Talk to your healthcare provider before you begin an exercise program   · Exercise for at least 30 minutes 5 days a week  Start slowly  Set aside time each day for physical activity that you enjoy and that is convenient for you  It is best to do both weight training and an activity that increases your heart rate, such as walking, bicycling, or swimming  · Find ways to be more active  Do yard work and housecleaning  Walk up the stairs instead of using elevators  Spend your leisure time going to events that require walking, such as outdoor festivals or fairs  This extra physical activity can help you lose weight and keep it off  Follow up with your healthcare provider as directed: You may need to meet with a dietitian  Write down your questions so you remember to ask them during your visits  © 2017 [address] [employer] Information is for End User's use only and may not be sold, redistributed or otherwise used for commercial purposes  All illustrations and images included in CareNotes® are the copyrighted property of [employer] M [employer]  or [employer]  The above information is an  only  It is not intended as medical advice for individual conditions or treatments  Talk to your doctor, nurse or pharmacist before following any medical regimen to see if it is safe and effective for you

## 2019-02-26 NOTE — ASSESSMENT & PLAN NOTE
Lab Results   Component Value Date    HGBA1C 10 9 (H) 02/09/2019       New diagnosis  Long discussion with pt and wife about DM, long term effects and treatment options  Will start Metformin twice daily  OK to start once diarrhea has resolved  Start lisinopril  Referral to DM classes  Advised to have yearly diabetic eye exams  Will do foot exam at next OV>   Recheck labs in 3 months  F/U in 3 months

## 2019-02-26 NOTE — ASSESSMENT & PLAN NOTE
Elevated TG with low HDL  LDL excellent  No need to start statin  No treatment as yet  Will likely improve with better glucose control  Recheck in 3 months

## 2019-06-17 ENCOUNTER — TELEPHONE (OUTPATIENT)
Dept: FAMILY MEDICINE CLINIC | Facility: HOSPITAL | Age: 34
End: 2019-06-17

## 2019-08-23 ENCOUNTER — HOSPITAL ENCOUNTER (EMERGENCY)
Facility: HOSPITAL | Age: 34
Discharge: HOME | End: 2019-08-23
Attending: EMERGENCY MEDICINE | Admitting: EMERGENCY MEDICINE
Payer: COMMERCIAL

## 2019-08-23 VITALS
DIASTOLIC BLOOD PRESSURE: 91 MMHG | BODY MASS INDEX: 36.86 KG/M2 | HEART RATE: 88 BPM | RESPIRATION RATE: 18 BRPM | WEIGHT: 291.01 LBS | OXYGEN SATURATION: 96 % | TEMPERATURE: 97.4 F | SYSTOLIC BLOOD PRESSURE: 167 MMHG

## 2019-08-23 DIAGNOSIS — R73.9 HYPERGLYCEMIA: ICD-10-CM

## 2019-08-23 DIAGNOSIS — G43.909 MIGRAINE HEADACHE: Primary | ICD-10-CM

## 2019-08-23 LAB
ALBUMIN SERPL BCP-MCNC: 3.9 G/DL (ref 3.5–5)
ALP SERPL-CCNC: 131 U/L (ref 46–116)
ALT SERPL W P-5'-P-CCNC: 55 U/L (ref 12–78)
ANION GAP SERPL CALCULATED.3IONS-SCNC: 10 MMOL/L (ref 4–13)
AST SERPL W P-5'-P-CCNC: 17 U/L (ref 5–45)
BASOPHILS # BLD AUTO: 0.1 THOUSANDS/ΜL (ref 0–0.1)
BASOPHILS NFR BLD AUTO: 1 % (ref 0–1)
BILIRUB SERPL-MCNC: 0.3 MG/DL (ref 0.2–1)
BUN SERPL-MCNC: 9 MG/DL (ref 5–25)
CALCIUM SERPL-MCNC: 9.4 MG/DL (ref 8.3–10.1)
CHLORIDE SERPL-SCNC: 96 MMOL/L (ref 100–108)
CO2 SERPL-SCNC: 30 MMOL/L (ref 21–32)
CREAT SERPL-MCNC: 0.8 MG/DL (ref 0.6–1.3)
EOSINOPHIL # BLD AUTO: 0.07 THOUSAND/ΜL (ref 0–0.61)
EOSINOPHIL NFR BLD AUTO: 1 % (ref 0–6)
ERYTHROCYTE [DISTWIDTH] IN BLOOD BY AUTOMATED COUNT: 12.7 % (ref 11.6–15.1)
GFR SERPL CREATININE-BSD FRML MDRD: 116 ML/MIN/1.73SQ M
GLUCOSE SERPL-MCNC: 305 MG/DL (ref 65–140)
HCT VFR BLD AUTO: 49.4 % (ref 36.5–49.3)
HGB BLD-MCNC: 16.6 G/DL (ref 12–17)
IMM GRANULOCYTES # BLD AUTO: 0.02 THOUSAND/UL (ref 0–0.2)
IMM GRANULOCYTES NFR BLD AUTO: 0 % (ref 0–2)
LYMPHOCYTES # BLD AUTO: 2.14 THOUSANDS/ΜL (ref 0.6–4.47)
LYMPHOCYTES NFR BLD AUTO: 24 % (ref 14–44)
MCH RBC QN AUTO: 28.2 PG (ref 26.8–34.3)
MCHC RBC AUTO-ENTMCNC: 33.6 G/DL (ref 31.4–37.4)
MCV RBC AUTO: 84 FL (ref 82–98)
MONOCYTES # BLD AUTO: 0.48 THOUSAND/ΜL (ref 0.17–1.22)
MONOCYTES NFR BLD AUTO: 5 % (ref 4–12)
NEUTROPHILS # BLD AUTO: 6.21 THOUSANDS/ΜL (ref 1.85–7.62)
NEUTS SEG NFR BLD AUTO: 69 % (ref 43–75)
NRBC BLD AUTO-RTO: 0 /100 WBCS
PLATELET # BLD AUTO: 284 THOUSANDS/UL (ref 149–390)
PMV BLD AUTO: 11.5 FL (ref 8.9–12.7)
POTASSIUM SERPL-SCNC: 3.8 MMOL/L (ref 3.5–5.3)
PROT SERPL-MCNC: 8.1 G/DL (ref 6.4–8.2)
RBC # BLD AUTO: 5.88 MILLION/UL (ref 3.88–5.62)
SODIUM SERPL-SCNC: 136 MMOL/L (ref 136–145)
WBC # BLD AUTO: 9.02 THOUSAND/UL (ref 4.31–10.16)

## 2019-08-23 PROCEDURE — 96375 TX/PRO/DX INJ NEW DRUG ADDON: CPT

## 2019-08-23 PROCEDURE — 36415 COLL VENOUS BLD VENIPUNCTURE: CPT | Performed by: PHYSICIAN ASSISTANT

## 2019-08-23 PROCEDURE — 85025 COMPLETE CBC W/AUTO DIFF WBC: CPT | Performed by: PHYSICIAN ASSISTANT

## 2019-08-23 PROCEDURE — 96365 THER/PROPH/DIAG IV INF INIT: CPT

## 2019-08-23 PROCEDURE — 99283 EMERGENCY DEPT VISIT LOW MDM: CPT

## 2019-08-23 PROCEDURE — 99284 EMERGENCY DEPT VISIT MOD MDM: CPT | Performed by: PHYSICIAN ASSISTANT

## 2019-08-23 PROCEDURE — 80053 COMPREHEN METABOLIC PANEL: CPT | Performed by: PHYSICIAN ASSISTANT

## 2019-08-23 RX ORDER — KETOROLAC TROMETHAMINE 30 MG/ML
15 INJECTION, SOLUTION INTRAMUSCULAR; INTRAVENOUS ONCE
Status: COMPLETED | OUTPATIENT
Start: 2019-08-23 | End: 2019-08-23

## 2019-08-23 RX ORDER — DIPHENHYDRAMINE HYDROCHLORIDE 50 MG/ML
25 INJECTION INTRAMUSCULAR; INTRAVENOUS ONCE
Status: COMPLETED | OUTPATIENT
Start: 2019-08-23 | End: 2019-08-23

## 2019-08-23 RX ORDER — METOCLOPRAMIDE HYDROCHLORIDE 5 MG/ML
10 INJECTION INTRAMUSCULAR; INTRAVENOUS ONCE
Status: COMPLETED | OUTPATIENT
Start: 2019-08-23 | End: 2019-08-23

## 2019-08-23 RX ORDER — MAGNESIUM SULFATE HEPTAHYDRATE 40 MG/ML
2 INJECTION, SOLUTION INTRAVENOUS ONCE
Status: COMPLETED | OUTPATIENT
Start: 2019-08-23 | End: 2019-08-23

## 2019-08-23 RX ADMIN — DIPHENHYDRAMINE HYDROCHLORIDE 25 MG: 50 INJECTION, SOLUTION INTRAMUSCULAR; INTRAVENOUS at 21:59

## 2019-08-23 RX ADMIN — METOCLOPRAMIDE 10 MG: 5 INJECTION, SOLUTION INTRAMUSCULAR; INTRAVENOUS at 21:59

## 2019-08-23 RX ADMIN — MAGNESIUM SULFATE HEPTAHYDRATE 2 G: 40 INJECTION, SOLUTION INTRAVENOUS at 22:01

## 2019-08-23 RX ADMIN — KETOROLAC TROMETHAMINE 15 MG: 30 INJECTION, SOLUTION INTRAMUSCULAR; INTRAVENOUS at 21:54

## 2019-08-23 RX ADMIN — SODIUM CHLORIDE 1000 ML: 0.9 INJECTION, SOLUTION INTRAVENOUS at 21:52

## 2019-08-24 NOTE — ED PROVIDER NOTES
History  Chief Complaint   Patient presents with    Headache - Recurrent or Known Dx Migraines     c/o headache, photophobia, fatigue x 3 days  Hx of migraine, similar to previous episodes  Patient is a 28 y/o M with h/o DM that presents to the ED with migraine headache that started 3 days ago  He states this headache is similar to previous headaches  He has nausea, no vomiting  No fevers, neck pain  He has photophobia and blurred vision  No numbness, weakness or dizziness  He has been taking ibuprofen for pain which helps for a little  He has not seen a neurologist for his migraines  He denies recent head injury  He states he is "pre diabetic" and is supposed to take Metformin, but has not been taking it  History provided by:  Patient  Headache   Pain location:  Frontal  Radiates to:  Does not radiate  Severity currently:  6/10  Severity at highest:  10/10  Onset quality:  Gradual  Duration:  3 days  Timing:  Intermittent  Progression:  Unchanged  Chronicity:  Recurrent  Similar to prior headaches: yes    Relieved by:  NSAIDs  Worsened by:  Light  Associated symptoms: blurred vision, nausea and photophobia    Associated symptoms: no abdominal pain, no back pain, no congestion, no cough, no diarrhea, no dizziness, no facial pain, no fever, no focal weakness, no hearing loss, no loss of balance, no neck pain, no neck stiffness, no numbness, no paresthesias, no seizures, no sinus pressure, no sore throat, no syncope, no tingling, no URI, no vomiting and no weakness        Prior to Admission Medications   Prescriptions Last Dose Informant Patient Reported? Taking?    buprenorphine (SUBUTEX) 8 mg 8/23/2019 at Unknown time  Yes Yes   Sig: 3 (three) times a day     ibuprofen (MOTRIN) 200 mg tablet 8/23/2019 at Unknown time  Yes Yes   Sig: Take by mouth every 6 (six) hours as needed for mild pain   lisinopril (ZESTRIL) 10 mg tablet Not Taking at Unknown time  No No   Sig: Take 1 tablet (10 mg total) by mouth daily   Patient not taking: Reported on 2019   metFORMIN (GLUCOPHAGE) 500 mg tablet More than a month at Unknown time  No No   Sig: Take 1 tablet (500 mg total) by mouth 2 (two) times a day with meals   omeprazole (PriLOSEC) 40 MG capsule 2019 at Unknown time  Yes Yes   Sig: Take 1 capsule by mouth      Facility-Administered Medications: None       Past Medical History:   Diagnosis Date    Acid reflux disease     Gout        Past Surgical History:   Procedure Laterality Date    WISDOM TOOTH EXTRACTION         Family History   Problem Relation Age of Onset   Kaela Porter Breast cancer Mother     Other Mother         degenerative disc disease     Diabetes Father     Diabetes Sister     Migraines Sister     Heart disease Maternal Aunt      I have reviewed and agree with the history as documented  Social History     Tobacco Use    Smoking status: Former Smoker     Packs/day: 0 25     Last attempt to quit: 2019     Years since quittin 5    Smokeless tobacco: Current User    Tobacco comment: thi some days smoker per allscripts    Substance Use Topics    Alcohol use: No    Drug use: No     Comment: hx of prescription drug abuse         Review of Systems   Constitutional: Negative for chills and fever  HENT: Negative for congestion, hearing loss, sinus pressure and sore throat  Eyes: Positive for blurred vision, photophobia and visual disturbance  Respiratory: Negative for cough and shortness of breath  Cardiovascular: Negative for chest pain, leg swelling and syncope  Gastrointestinal: Positive for nausea  Negative for abdominal pain, diarrhea and vomiting  Endocrine: Negative for polydipsia and polyuria  Genitourinary: Negative for dysuria  Musculoskeletal: Negative for back pain, neck pain and neck stiffness  Skin: Negative for color change and rash  Neurological: Positive for headaches   Negative for dizziness, tremors, focal weakness, seizures, syncope, facial asymmetry, speech difficulty, weakness, light-headedness, numbness, paresthesias and loss of balance  Psychiatric/Behavioral: Negative for confusion and decreased concentration  All other systems reviewed and are negative  Physical Exam  Physical Exam   Constitutional: He is oriented to person, place, and time  He appears well-developed and well-nourished  He is cooperative  He does not appear ill  No distress  HENT:   Head: Normocephalic and atraumatic  Right Ear: Hearing and tympanic membrane normal    Left Ear: Hearing and tympanic membrane normal    Nose: Nose normal    Mouth/Throat: Oropharynx is clear and moist and mucous membranes are normal    Eyes: Pupils are equal, round, and reactive to light  Conjunctivae and EOM are normal    Neck: Normal range of motion  Neck supple  No neck rigidity  Cardiovascular: Normal rate, regular rhythm and normal heart sounds  No murmur heard  Pulmonary/Chest: Effort normal and breath sounds normal  He has no wheezes  He has no rhonchi  He has no rales  Abdominal: Soft  Normal appearance and bowel sounds are normal  There is no tenderness  Musculoskeletal: Normal range of motion  He exhibits no edema or deformity  Neurological: He is alert and oriented to person, place, and time  He has normal strength  No cranial nerve deficit or sensory deficit  He displays a negative Romberg sign  Coordination and gait normal  GCS eye subscore is 4  GCS verbal subscore is 5  GCS motor subscore is 6  Skin: Skin is warm and dry  No rash noted  He is not diaphoretic  No pallor  Psychiatric: He has a normal mood and affect  His speech is normal  Cognition and memory are normal    Nursing note and vitals reviewed        Vital Signs  ED Triage Vitals [08/23/19 2101]   Temperature Pulse Respirations Blood Pressure SpO2   (!) 97 4 °F (36 3 °C) 88 18 167/91 96 %      Temp Source Heart Rate Source Patient Position - Orthostatic VS BP Location FiO2 (%)   Tympanic Monitor Sitting Right arm --      Pain Score       7           Vitals:    08/23/19 2101   BP: 167/91   Pulse: 88   Patient Position - Orthostatic VS: Sitting         Visual Acuity      ED Medications  Medications   sodium chloride 0 9 % bolus 1,000 mL (1,000 mL Intravenous New Bag 8/23/19 2152)   magnesium sulfate 2 g/50 mL IVPB (premix) 2 g (2 g Intravenous New Bag 8/23/19 2201)   diphenhydrAMINE (BENADRYL) injection 25 mg (25 mg Intravenous Given 8/23/19 2159)   metoclopramide (REGLAN) injection 10 mg (10 mg Intravenous Given 8/23/19 2159)   ketorolac (TORADOL) injection 15 mg (15 mg Intravenous Given 8/23/19 2154)       Diagnostic Studies  Results Reviewed     Procedure Component Value Units Date/Time    Comprehensive metabolic panel [[id number]]  (Abnormal) Collected:  08/23/19 2146    Lab Status:  Final result Specimen:  Blood from Arm, Left Updated:  08/23/19 2210     Sodium 136 mmol/L      Potassium 3 8 mmol/L      Chloride 96 mmol/L      CO2 30 mmol/L      ANION GAP 10 mmol/L      BUN 9 mg/dL      Creatinine 0 80 mg/dL      Glucose 305 mg/dL      Calcium 9 4 mg/dL      AST 17 U/L      ALT 55 U/L      Alkaline Phosphatase 131 U/L      Total Protein 8 1 g/dL      Albumin 3 9 g/dL      Total Bilirubin 0 30 mg/dL      eGFR 116 ml/min/1 73sq m     Narrative:       [place] guidelines for Chronic Kidney Disease (CKD):     Stage 1 with normal or high GFR (GFR > 90 mL/min/1 73 square meters)    Stage 2 Mild CKD (GFR = 60-89 mL/min/1 73 square meters)    Stage 3A Moderate CKD (GFR = 45-59 mL/min/1 73 square meters)    Stage 3B Moderate CKD (GFR = 30-44 mL/min/1 73 square meters)    Stage 4 Severe CKD (GFR = 15-29 mL/min/1 73 square meters)    Stage 5 End Stage CKD (GFR <15 mL/min/1 73 square meters)  Note: GFR calculation is accurate only with a steady state creatinine    CBC and differential [[id number]]  (Abnormal) Collected:  08/23/19 2146    Lab Status:  Final result Specimen:  Blood from Arm, Left Updated:  08/23/19 2155     WBC 9 02 Thousand/uL      RBC 5 88 Million/uL      Hemoglobin 16 6 g/dL      Hematocrit 49 4 %      MCV 84 fL      MCH 28 2 pg      MCHC 33 6 g/dL      RDW 12 7 %      MPV 11 5 fL      Platelets 051 Thousands/uL      nRBC 0 /100 WBCs      Neutrophils Relative 69 %      Immat GRANS % 0 %      Lymphocytes Relative 24 %      Monocytes Relative 5 %      Eosinophils Relative 1 %      Basophils Relative 1 %      Neutrophils Absolute 6 21 Thousands/µL      Immature Grans Absolute 0 02 Thousand/uL      Lymphocytes Absolute 2 14 Thousands/µL      Monocytes Absolute 0 48 Thousand/µL      Eosinophils Absolute 0 07 Thousand/µL      Basophils Absolute 0 10 Thousands/µL                  No orders to display              Procedures  Procedures       ED Course  ED Course as of Aug 23 2238   Fri Aug 23, 2019   2232 Patient states he is feeling better  Pain is 4/10      2237 Care transferred to Dr Laura Anderson  MDM  Number of Diagnoses or Management Options  Hyperglycemia: new and requires workup  Migraine headache: new and requires workup  Diagnosis management comments: Patient with migraine headache, similar to previous, will give migraine cocktail and re-evaluate  Patient with hyperglycemia, he has not been taking metformin, instructed patient to start taking his medication           Amount and/or Complexity of Data Reviewed  Clinical lab tests: ordered and reviewed    Patient Progress  Patient progress: improved      Disposition  Final diagnoses:   Migraine headache   Hyperglycemia     Time reflects when diagnosis was documented in both MDM as applicable and the Disposition within this note     Time User Action Codes Description Comment    8/23/2019  9:55 PM Nikita Carvajal Add [G43 909] Migraine headache     8/23/2019 10:34 PM Nikita Carvajal Add [R73 9] Hyperglycemia       ED Disposition     None      Follow-up Information     Follow up With Specialties Details Why [address], 10 [facility], Nurse Practitioner Schedule an appointment as soon as possible for a visit  For shawna Donnelly  [address] [phone]            Patient's Medications   Discharge Prescriptions    No medications on file     No discharge procedures on file      ED Provider  Electronically Signed by           Dionte Fairbanks PA-C  08/23/19 0847

## 2019-08-24 NOTE — ED NOTES
Pt discharged from ED after having instructions reviewed  Pt verbalized understanding and line was pulled        Donovan Finch RN  08/23/19 0429

## 2019-08-24 NOTE — ED CARE HANDOFF
Emergency Department Sign Out Note        Sign out and transfer of care from [facility]  See Separate Emergency Department note  The patient, Kathia Borges, was evaluated by the previous provider for acute headache  Workup Completed:  Labs significant for hyperglycemia  Patient advised to take his metformin  Migraine cocktail in process  ED Course / Workup Pending (followup): Patient re-evaluated  Significant improvement in headache and requesting to go home  Discussed return precautions with patient  Procedures  MDM    Disposition  Final diagnoses:   Migraine headache   Hyperglycemia     Time reflects when diagnosis was documented in both MDM as applicable and the Disposition within this note     Time User Action Codes Description Comment    8/23/2019  9:55 PM Oliver Blessing Add [G43 909] Migraine headache     8/23/2019 10:34 PM Oliver Funk Add [R73 9] Hyperglycemia       ED Disposition     ED Disposition Condition Date/Time Comment    Discharge Stable Fri Aug 23, 2019 11:03 PM Kathia Borges discharge to home/self care  Follow-up Information     Follow up With Specialties Details Why [address] [place] [address], [address] [place] [address], Nurse Practitioner Schedule an appointment as soon as possible for a visit  [facility]ck [facility]  [address] [place] [address] [phone]          Patient's Medications   Discharge Prescriptions    No medications on file     No discharge procedures on file         ED Provider  Electronically Signed by     Stanley Rawls MD  08/23/19 9842

## 2019-08-24 NOTE — DISCHARGE INSTRUCTIONS
Rest, increase fluids  Take metformin as directed  Tylenol/motrin as needed for headaches  Follow up with family doctor for recheck of hyperglycemia  Return to ER if symptoms worsen

## 2019-08-28 ENCOUNTER — VBI (OUTPATIENT)
Dept: FAMILY MEDICINE CLINIC | Facility: HOSPITAL | Age: 34
End: 2019-08-28

## 2019-08-28 NOTE — LETTER
Peggy Neff MD  [address] [facility] [address]    Date: 08/30/19    Obie Rai  [address] [facility] [address]    Dear Denver Ashford: Thank you for choosing [facility] emergency department for care  As your primary care provider we want to make sure that your ongoing medical care is being addressed  If you require follow up care as a result of your emergency department visit, there are a few things we would like you to know  As part of our continuing commitment to caring for our patient, we have added more same day appointments and have extended our office hours to meet your medical needs  After hours, on-call physicians are available via telephone  We encourage you to contact our office prior to seeking treatment to discuss your symptoms with our medical staff  Together, we can determine the correct course of action  A majority of non-emergent conditions such as: common cold, flu-like symptoms, fevers, strains/sprains, dislocations, minor burns, cuts and animal bites can be treated at [facility]  Diagnostic testing is available at some sites  Of course, if you are experiencing a life threatening medical emergency call 911 or proceed directly to the nearest emergency room      Your nearest [facility] is conveniently located at:    Hemant OSWALD [address]  [phone]    Sincerely,    Peggy Neff MD  Dept: [phone]

## 2019-09-03 NOTE — TELEPHONE ENCOUNTER
Mirta Aleman    ED Visit Information     Ed visit date: 8/23/2019  Diagnosis Description: Migraine headache; Hyperglycemia  In Network? Yes [address]  Discharge status: Home  Discharged with meds ? No  Number of ED visits to date: 1  ED Severity:n/a     Outreach Information    Outreach successful: Yes 2  Date letter mailed:8/30/2019    Date Finalized:8/30/2019    Care Coordination    Follow up appointment with pcp: no No ED f/u appt scheduled  Transportation issues ? NA    [employer] Base Outreach    08/28/2019 08:59 AM Phone ([employer]) Kathryn Camp (Self) [phone] (H)   Left Message  Unable to reach patient regarding recent ED visit on 8/23 for Migraine headache; Hyperglycemia  Patient was discharged without medication and advised to follow up with PCP  2nd attempt will be made on 8/30 08/30/2019 12:36 PM Phone ([employer]) Kathryn Camp (Self) [phone] (H)   Left Message  Unable to reach patient regarding recent ED visit on 8/23 for Migraine headache; Hyperglycemia  Patient was discharged without medication and advised to follow up with PCP  Letter generated and mailed

## 2022-12-27 ENCOUNTER — OFFICE VISIT (OUTPATIENT)
Dept: FAMILY MEDICINE CLINIC | Facility: HOSPITAL | Age: 37
End: 2022-12-27

## 2022-12-27 VITALS
HEIGHT: 74 IN | SYSTOLIC BLOOD PRESSURE: 138 MMHG | DIASTOLIC BLOOD PRESSURE: 86 MMHG | BODY MASS INDEX: 35.83 KG/M2 | TEMPERATURE: 96.6 F | HEART RATE: 80 BPM | WEIGHT: 279.2 LBS

## 2022-12-27 DIAGNOSIS — Z11.59 ENCOUNTER FOR HEPATITIS C SCREENING TEST FOR LOW RISK PATIENT: ICD-10-CM

## 2022-12-27 DIAGNOSIS — K42.9 UMBILICAL HERNIA WITHOUT OBSTRUCTION AND WITHOUT GANGRENE: ICD-10-CM

## 2022-12-27 DIAGNOSIS — I10 ESSENTIAL HYPERTENSION: ICD-10-CM

## 2022-12-27 DIAGNOSIS — E11.69 TYPE 2 DIABETES MELLITUS WITH OTHER SPECIFIED COMPLICATION, WITHOUT LONG-TERM CURRENT USE OF INSULIN (HCC): Primary | ICD-10-CM

## 2022-12-27 DIAGNOSIS — E11.65 TYPE 2 DIABETES MELLITUS WITH HYPERGLYCEMIA, WITHOUT LONG-TERM CURRENT USE OF INSULIN (HCC): ICD-10-CM

## 2022-12-27 DIAGNOSIS — Z11.4 SCREENING FOR HIV (HUMAN IMMUNODEFICIENCY VIRUS): ICD-10-CM

## 2022-12-27 DIAGNOSIS — E78.1 HYPERTRIGLYCERIDEMIA: ICD-10-CM

## 2022-12-27 LAB — SL AMB POCT HEMOGLOBIN AIC: 8.5 (ref ?–6.5)

## 2022-12-27 RX ORDER — DULAGLUTIDE 1.5 MG/.5ML
1.5 INJECTION, SOLUTION SUBCUTANEOUS WEEKLY
Qty: 6 ML | Refills: 0 | Status: SHIPPED | OUTPATIENT
Start: 2022-12-27

## 2022-12-27 RX ORDER — ROSUVASTATIN CALCIUM 10 MG/1
10 TABLET, COATED ORAL DAILY
COMMUNITY
Start: 2022-12-13

## 2022-12-27 RX ORDER — DULAGLUTIDE 0.75 MG/.5ML
INJECTION, SOLUTION SUBCUTANEOUS
COMMUNITY
Start: 2022-12-23 | End: 2022-12-27 | Stop reason: ALTCHOICE

## 2022-12-27 RX ORDER — EMPAGLIFLOZIN 25 MG/1
25 TABLET, FILM COATED ORAL DAILY
COMMUNITY
Start: 2022-12-07

## 2022-12-27 NOTE — PROGRESS NOTES
Assessment/Plan:    Type 2 diabetes mellitus with hyperglycemia, without long-term current use of insulin ([facility])    Lab Results   Component Value Date    HGBA1C 8 5 (A) 12/27/2022   Not well controlled  On 3 agents  Unable to go up on metformin due to diarrhea  Will increase trulicity to 1 5 mg dose  Refer to endo given his A1c is hard to control  Willing to do diabetic education so referral made  Foot exam done today  In office retinal exam done  ADD: Unable to obtain exam- has eye doc appointment next month  Will make sure they do KATE  Check labs  Urine for microalbumin obtained in office and will be sent out  F/U in 3 months  Essential hypertension  BP is controlled  Continue on current regimen  Hypertriglyceridemia  On statin  Check FLP  Diagnoses and all orders for this visit:    Type 2 diabetes mellitus with other specified complication, without long-term current use of insulin ([facility] [address] )  -     IRIS Diabetic eye exam  -     Ambulatory referral to Diabetic Education; Future  -     Ambulatory referral to Endocrinology; Future    Type 2 diabetes mellitus with hyperglycemia, without long-term current use of insulin ([facility])  -     CBC and differential; Future  -     Comprehensive metabolic panel; Future  -     TSH, 3rd generation with Free T4 reflex; Future  -     POCT hemoglobin A1c  -     Microalbumin / creatinine urine ratio  -     CBC and differential  -     Comprehensive metabolic panel  -     TSH, 3rd generation with Free T4 reflex  -     Dulaglutide (Trulicity) 1 5 WY/0 8QY SOPN; Inject 0 5 mL (1 5 mg total) under the skin once a week    Essential hypertension    Hypertriglyceridemia  -     Lipid panel; Future  -     Lipid panel    Umbilical hernia without obstruction and without gangrene  -     Ambulatory Referral to General Surgery; Future    Screening for HIV (human immunodeficiency virus)  -     HIV 1/2 AG/AB W REFLEX [facility] and [facility] only;  Future  -     HIV 1/2 AG/AB W REFLEX [facility] and [facility] only    Encounter for hepatitis C screening test for low risk patient  -     Hepatitis C antibody; Future  -     Hepatitis C antibody    Other orders  -     Discontinue: Trulicity 9 68 CR/7 8GY SOPN  -     Jardiance 25 MG TABS; Take 25 mg by mouth daily  -     rosuvastatin (CRESTOR) 10 MG tablet; Take 10 mg by mouth daily          Subjective:      Patient ID: Huy Spaulding is a 44 y o  male  Previous pt that transferred and here today to establish care  He is an uncontrolled diabetic  Currently on 3 agents  Metformin above 1000 mg causes diarrhea  Tries to follow low carb and sugar diet  Tries to eat higher protein diet  Active at work but no formal exercise  Checks blood sugars twice/day  Has not done diabetic education  The following portions of the patient's history were reviewed and updated as appropriate: allergies, current medications, past family history, past medical history, past social history, past surgical history and problem list     Review of Systems   Constitutional: Negative for fatigue and unexpected weight change  Eyes: Negative for visual disturbance  Respiratory: Negative for shortness of breath  Cardiovascular: Negative for chest pain, palpitations and leg swelling  Endocrine: Negative for polydipsia, polyphagia and polyuria  Musculoskeletal: Negative for myalgias  Neurological: Negative for dizziness, weakness, light-headedness, numbness and headaches  Objective:  Vitals:    12/27/22 1849   BP: 138/86   Pulse: 80   Temp: (!) 96 6 °F (35 9 °C)      Physical Exam  Vitals reviewed  Constitutional:       Appearance: Normal appearance  He is well-developed  Cardiovascular:      Rate and Rhythm: Normal rate and regular rhythm  Pulses: no weak pulses          Carotid pulses are 2+ on the right side and 2+ on the left side  Dorsalis pedis pulses are 2+ on the right side and 2+ on the left side          Posterior tibial pulses are 2+ on the right side and 2+ on the left side  Heart sounds: Normal heart sounds  No murmur heard  Pulmonary:      Effort: Pulmonary effort is normal       Breath sounds: Normal breath sounds  Feet:      Right foot:      Skin integrity: Dry skin present  No ulcer, skin breakdown, erythema, warmth or callus  Left foot:      Skin integrity: Dry skin present  No ulcer, skin breakdown, erythema, warmth or callus  Skin:     General: Skin is warm and dry  Neurological:      Mental Status: He is alert and oriented to person, place, and time  Psychiatric:         Mood and Affect: Mood normal          Behavior: Behavior normal          Thought Content: Thought content normal          Judgment: Judgment normal        Patient's shoes and socks removed  Right Foot/Ankle   Right Foot Inspection  Skin Exam: skin normal, skin intact and dry skin  No warmth, no callus, no erythema, no maceration, no abnormal color, no pre-ulcer, no ulcer and no callus  Toe Exam: ROM and strength within normal limits  Sensory   Vibration: intact  Monofilament testing: intact    Vascular  Capillary refills: < 3 seconds  The right DP pulse is 2+  The right PT pulse is 2+  Left Foot/Ankle  Left Foot Inspection  Skin Exam: skin normal, skin intact and dry skin  No warmth, no erythema, no maceration, normal color, no pre-ulcer, no ulcer and no callus  Toe Exam: ROM and strength within normal limits  Sensory   Vibration: intact  Monofilament testing: intact    Vascular  Capillary refills: < 3 seconds  The left DP pulse is 2+  The left PT pulse is 2+  Assign Risk Category  No deformity present  No loss of protective sensation  No weak pulses  Risk: 0    BMI Counseling: Body mass index is 35 85 kg/m²   The BMI is above normal  Nutrition recommendations include reducing portion sizes, decreasing overall calorie intake, 3-5 servings of fruits/vegetables daily, reducing fast food intake, consuming healthier snacks, decreasing soda and/or juice intake, moderation in carbohydrate intake, increasing intake of lean protein, reducing intake of saturated fat and trans fat and reducing intake of cholesterol  Exercise recommendations include moderate aerobic physical activity for 150 minutes/week

## 2022-12-28 ENCOUNTER — TELEPHONE (OUTPATIENT)
Dept: ENDOCRINOLOGY | Facility: CLINIC | Age: 37
End: 2022-12-28

## 2022-12-28 NOTE — PATIENT INSTRUCTIONS
Obesity   AMBULATORY CARE:   Obesity  means your body mass index (BMI) is greater than 30  Your healthcare provider will use your height and weight to measure your BMI  The risks of obesity include  many health problems, including injuries or physical disability  · Diabetes (high blood sugar level)    · High blood pressure or high cholesterol    · Heart disease    · Stroke    · Gallbladder or liver disease    · Cancer of the colon, breast, prostate, liver, or kidney    · Sleep apnea    · Arthritis or gout    Screening  is done to check for health conditions before you have signs or symptoms  If you are 28to 79years old, your blood sugar level may be checked every 3 years for signs of prediabetes or diabetes  Your healthcare provider will check your blood pressure at each visit  High blood pressure can lead to a stroke or other problems  Your provider may check for signs of heart disease, cancer, or other health problems  Seek care immediately if:   · You have a severe headache, confusion, or difficulty speaking  · You have weakness on one side of your body  · You have chest pain, sweating, or shortness of breath  Call your doctor if:   · You have symptoms of gallbladder or liver disease, such as pain in your upper abdomen  · You have knee or hip pain and discomfort while walking  · You have symptoms of diabetes, such as intense hunger and thirst, and frequent urination  · You have symptoms of sleep apnea, such as snoring or daytime sleepiness  · You have questions or concerns about your condition or care  Treatment for obesity  focuses on helping you lose weight to improve your health  Even a small decrease in BMI can reduce the risk for many health problems  Your healthcare provider will help you set a weight-loss goal   · Lifestyle changes  are the first step in treating obesity  These include making healthy food choices and getting regular physical activity   Your healthcare provider may suggest a weight-loss program that involves coaching, education, and therapy  · Medicine  may help you lose weight when it is used with a healthy foods and physical activity  · Surgery  can help you lose weight if you are very obese and have other health problems  There are several types of weight-loss surgery  Ask your healthcare provider for more information  Tips for safe weight loss:   · Set small, realistic goals  An example of a small goal is to walk for 20 minutes 5 days a week  Anther goal is to lose 5% of your body weight  · Tell friends, family members, and coworkers about your goals  and ask for their support  Ask a friend to lose weight with you, or join a weight-loss support group  · Identify foods or triggers that may cause you to overeat , and find ways to avoid them  Remove tempting high-calorie foods from your home and workplace  Place a bowl of fresh fruit on your kitchen counter  If stress causes you to eat, then find other ways to cope with stress  A counselor or therapist may be able to help you  · Keep a diary to track what you eat and drink  Also write down how many minutes of physical activity you do each day  Weigh yourself once a week and record it in your diary  Eating changes: You will need to eat 500 to 1,000 fewer calories each day than you currently eat to lose 1 to 2 pounds a week  The following changes will help you cut calories:  · Eat smaller portions  Use small plates, no larger than 9 inches in diameter  Fill your plate half full of fruits and vegetables  Measure your food using measuring cups until you know what a serving size looks like  · Eat 3 meals and 1 or 2 snacks each day  Plan your meals in advance  Mandy Allen and eat at home most of the time  Eat slowly  Do not skip meals  Skipping meals can lead to overeating later in the day  This can make it harder for you to lose weight   Talk with a dietitian to help you make a meal plan and schedule that is right for you  · Eat fruits and vegetables at every meal   They are low in calories and high in fiber, which makes you feel full  Do not add butter, margarine, or cream sauce to vegetables  Use herbs to season steamed vegetables  · Eat less fat and fewer fried foods  Eat more baked or grilled chicken and fish  These protein sources are lower in calories and fat than red meat  Limit fast food  Dress your salads with olive oil and vinegar instead of bottled dressing  · Limit the amount of sugar you eat  Do not drink sugary beverages  Limit alcohol  Activity changes:  Physical activity is good for your body in many ways  It helps you burn calories and build strong muscles  It decreases stress and depression, and improves your mood  It can also help you sleep better  Talk to your healthcare provider before you begin an exercise program   · Exercise for at least 30 minutes 5 days a week  Start slowly  Set aside time each day for physical activity that you enjoy and that is convenient for you  It is best to do both weight training and an activity that increases your heart rate, such as walking, bicycling, or swimming  · Find ways to be more active  Do yard work and housecleaning  Walk up the stairs instead of using elevators  Spend your leisure time going to events that require walking, such as outdoor festivals or fairs  This extra physical activity can help you lose weight and keep it off  Follow up with your doctor as directed: You may need to meet with a dietitian  Write down your questions so you remember to ask them during your visits  © Copyright [employer] 2022 Information is for End User's use only and may not be sold, redistributed or otherwise used for commercial purposes  All illustrations and images included in CareNotes® are the copyrighted property of A D A M [employer] Inc  or Sylvie Swanson   The above information is an  only   It is not intended as medical advice for individual conditions or treatments  Talk to your doctor, nurse or pharmacist before following any medical regimen to see if it is safe and effective for you

## 2022-12-28 NOTE — ASSESSMENT & PLAN NOTE
Lab Results   Component Value Date    HGBA1C 8 5 (A) 12/27/2022   Not well controlled  On 3 agents  Unable to go up on metformin due to diarrhea  Will increase trulicity to 1 5 mg dose  Refer to liz given his A1c is hard to control  Willing to do diabetic education so referral made  Foot exam done today  In office retinal exam done  ADD: Unable to obtain exam- has eye doc appointment next month  Will make sure they do KATE  Check labs  Urine for microalbumin obtained in office and will be sent out  F/U in 3 months

## 2022-12-29 LAB
ALBUMIN/CREAT UR: <6 MG/G CREAT (ref 0–29)
CREAT UR-MCNC: 49.2 MG/DL
MICROALBUMIN UR-MCNC: <3 UG/ML

## 2023-01-22 DIAGNOSIS — E87.5 HYPERKALEMIA: Primary | ICD-10-CM

## 2023-01-22 LAB
ALBUMIN SERPL-MCNC: 4.8 G/DL (ref 4–5)
ALBUMIN/GLOB SERPL: 2.1 {RATIO} (ref 1.2–2.2)
ALP SERPL-CCNC: 95 IU/L (ref 44–121)
ALT SERPL-CCNC: 45 IU/L (ref 0–44)
AST SERPL-CCNC: 26 IU/L (ref 0–40)
BASOPHILS # BLD AUTO: 0.1 X10E3/UL (ref 0–0.2)
BASOPHILS NFR BLD AUTO: 1 %
BILIRUB SERPL-MCNC: 0.4 MG/DL (ref 0–1.2)
BUN SERPL-MCNC: 14 MG/DL (ref 6–20)
BUN/CREAT SERPL: 17 (ref 9–20)
CALCIUM SERPL-MCNC: 10 MG/DL (ref 8.7–10.2)
CHLORIDE SERPL-SCNC: 102 MMOL/L (ref 96–106)
CHOLEST SERPL-MCNC: 118 MG/DL (ref 100–199)
CHOLEST/HDLC SERPL: 3.9 RATIO (ref 0–5)
CO2 SERPL-SCNC: 25 MMOL/L (ref 20–29)
CREAT SERPL-MCNC: 0.82 MG/DL (ref 0.76–1.27)
EGFR: 115 ML/MIN/1.73
EOSINOPHIL # BLD AUTO: 0.2 X10E3/UL (ref 0–0.4)
EOSINOPHIL NFR BLD AUTO: 3 %
ERYTHROCYTE [DISTWIDTH] IN BLOOD BY AUTOMATED COUNT: 13.5 % (ref 11.6–15.4)
GLOBULIN SER-MCNC: 2.3 G/DL (ref 1.5–4.5)
GLUCOSE SERPL-MCNC: 139 MG/DL (ref 70–99)
HCT VFR BLD AUTO: 46.8 % (ref 37.5–51)
HCV AB S/CO SERPL IA: <0.1 S/CO RATIO (ref 0–0.9)
HDLC SERPL-MCNC: 30 MG/DL
HGB BLD-MCNC: 15.7 G/DL (ref 13–17.7)
HIV 1+2 AB+HIV1 P24 AG SERPL QL IA: NON REACTIVE
IMM GRANULOCYTES # BLD: 0 X10E3/UL (ref 0–0.1)
IMM GRANULOCYTES NFR BLD: 0 %
LDLC SERPL CALC-MCNC: 66 MG/DL (ref 0–99)
LYMPHOCYTES # BLD AUTO: 2 X10E3/UL (ref 0.7–3.1)
LYMPHOCYTES NFR BLD AUTO: 32 %
MCH RBC QN AUTO: 27.6 PG (ref 26.6–33)
MCHC RBC AUTO-ENTMCNC: 33.5 G/DL (ref 31.5–35.7)
MCV RBC AUTO: 82 FL (ref 79–97)
MONOCYTES # BLD AUTO: 0.5 X10E3/UL (ref 0.1–0.9)
MONOCYTES NFR BLD AUTO: 8 %
NEUTROPHILS # BLD AUTO: 3.6 X10E3/UL (ref 1.4–7)
NEUTROPHILS NFR BLD AUTO: 56 %
PLATELET # BLD AUTO: 248 X10E3/UL (ref 150–450)
POTASSIUM SERPL-SCNC: 5.8 MMOL/L (ref 3.5–5.2)
PROT SERPL-MCNC: 7.1 G/DL (ref 6–8.5)
RBC # BLD AUTO: 5.69 X10E6/UL (ref 4.14–5.8)
SL AMB VLDL CHOLESTEROL CALC: 22 MG/DL (ref 5–40)
SODIUM SERPL-SCNC: 139 MMOL/L (ref 134–144)
TRIGL SERPL-MCNC: 121 MG/DL (ref 0–149)
TSH SERPL DL<=0.005 MIU/L-ACNC: 2.64 UIU/ML (ref 0.45–4.5)
WBC # BLD AUTO: 6.4 X10E3/UL (ref 3.4–10.8)

## 2023-01-31 ENCOUNTER — TELEPHONE (OUTPATIENT)
Dept: DIABETES SERVICES | Facility: CLINIC | Age: 38
End: 2023-01-31

## 2023-03-01 DIAGNOSIS — E11.65 TYPE 2 DIABETES MELLITUS WITH HYPERGLYCEMIA, WITHOUT LONG-TERM CURRENT USE OF INSULIN (HCC): ICD-10-CM

## 2023-03-16 DIAGNOSIS — E11.65 TYPE 2 DIABETES MELLITUS WITH HYPERGLYCEMIA, WITHOUT LONG-TERM CURRENT USE OF INSULIN (HCC): ICD-10-CM

## 2023-03-16 RX ORDER — DULAGLUTIDE 1.5 MG/.5ML
INJECTION, SOLUTION SUBCUTANEOUS
Qty: 2 ML | Refills: 2 | Status: SHIPPED | OUTPATIENT
Start: 2023-03-16

## 2023-03-29 DIAGNOSIS — E11.65 TYPE 2 DIABETES MELLITUS WITH HYPERGLYCEMIA, WITHOUT LONG-TERM CURRENT USE OF INSULIN (HCC): ICD-10-CM

## 2023-04-21 DIAGNOSIS — E78.1 HYPERTRIGLYCERIDEMIA: Primary | ICD-10-CM

## 2023-04-21 DIAGNOSIS — E11.65 TYPE 2 DIABETES MELLITUS WITH HYPERGLYCEMIA, WITHOUT LONG-TERM CURRENT USE OF INSULIN (HCC): ICD-10-CM

## 2023-04-27 DIAGNOSIS — E11.65 TYPE 2 DIABETES MELLITUS WITH HYPERGLYCEMIA, WITHOUT LONG-TERM CURRENT USE OF INSULIN (HCC): ICD-10-CM

## 2023-05-28 DIAGNOSIS — E11.65 TYPE 2 DIABETES MELLITUS WITH HYPERGLYCEMIA, WITHOUT LONG-TERM CURRENT USE OF INSULIN (HCC): ICD-10-CM

## 2024-01-05 ENCOUNTER — TELEPHONE (OUTPATIENT)
Dept: FAMILY MEDICINE CLINIC | Facility: HOSPITAL | Age: 39
End: 2024-01-05

## 2024-03-30 LAB — HBA1C MFR BLD HPLC: 7.1 %

## 2024-07-10 ENCOUNTER — TELEPHONE (OUTPATIENT)
Dept: FAMILY MEDICINE CLINIC | Facility: HOSPITAL | Age: 39
End: 2024-07-10

## 2024-07-10 NOTE — TELEPHONE ENCOUNTER
Per patient chart - labs in media and recent refills of medication.    Patient is seeing Dr West Turpin.    Pls remove Angelina Henson as pcp.

## 2024-07-22 NOTE — TELEPHONE ENCOUNTER
07/22/24 9:46 AM     The office's request has been received, reviewed, and the patient chart updated. The PCP has successfully been removed with a patient attribution note. This message will now be completed.    Thank you  Yadira Moon